# Patient Record
Sex: FEMALE | Race: WHITE | NOT HISPANIC OR LATINO | URBAN - METROPOLITAN AREA
[De-identification: names, ages, dates, MRNs, and addresses within clinical notes are randomized per-mention and may not be internally consistent; named-entity substitution may affect disease eponyms.]

---

## 2018-06-30 ENCOUNTER — EMERGENCY (EMERGENCY)
Facility: HOSPITAL | Age: 68
LOS: 1 days | Discharge: ANOTHER HOSPITAL | End: 2018-06-30
Attending: EMERGENCY MEDICINE
Payer: MEDICARE

## 2018-06-30 ENCOUNTER — TRANSCRIPTION ENCOUNTER (OUTPATIENT)
Age: 68
End: 2018-06-30

## 2018-06-30 VITALS
TEMPERATURE: 98 F | SYSTOLIC BLOOD PRESSURE: 175 MMHG | HEIGHT: 62 IN | RESPIRATION RATE: 16 BRPM | HEART RATE: 56 BPM | OXYGEN SATURATION: 100 % | WEIGHT: 179.9 LBS | DIASTOLIC BLOOD PRESSURE: 72 MMHG

## 2018-06-30 DIAGNOSIS — Z98.89 OTHER SPECIFIED POSTPROCEDURAL STATES: Chronic | ICD-10-CM

## 2018-06-30 DIAGNOSIS — S62.109A FRACTURE OF UNSPECIFIED CARPAL BONE, UNSPECIFIED WRIST, INITIAL ENCOUNTER FOR CLOSED FRACTURE: Chronic | ICD-10-CM

## 2018-06-30 PROCEDURE — 99285 EMERGENCY DEPT VISIT HI MDM: CPT

## 2018-06-30 NOTE — ED ADULT TRIAGE NOTE - CHIEF COMPLAINT QUOTE
tripped over the concrete curb and fell, hurt left leg, pain to left hip and knee, denies any trauma to head, no loc

## 2018-06-30 NOTE — ED ADULT NURSE NOTE - OBJECTIVE STATEMENT
Pt presents to the ED S/P fall on a curve in a parking lot at a bingo night, C/O left hip and left ankle pain. Denies hitting her head, or blood thinners. Noted left ankle abrasion. no deformity noted. Vss, afebrile. Awaiting X-ray will continue to monitor

## 2018-07-01 ENCOUNTER — INPATIENT (INPATIENT)
Facility: HOSPITAL | Age: 68
LOS: 3 days | Discharge: NURSING, REHAB OR LONG-TERM CARE | DRG: 482 | End: 2018-07-05
Attending: ORTHOPAEDIC SURGERY | Admitting: ORTHOPAEDIC SURGERY
Payer: MEDICARE

## 2018-07-01 VITALS
SYSTOLIC BLOOD PRESSURE: 131 MMHG | HEART RATE: 77 BPM | TEMPERATURE: 98 F | DIASTOLIC BLOOD PRESSURE: 83 MMHG | RESPIRATION RATE: 16 BRPM | OXYGEN SATURATION: 98 %

## 2018-07-01 VITALS
OXYGEN SATURATION: 97 % | DIASTOLIC BLOOD PRESSURE: 68 MMHG | HEART RATE: 64 BPM | RESPIRATION RATE: 16 BRPM | SYSTOLIC BLOOD PRESSURE: 137 MMHG

## 2018-07-01 DIAGNOSIS — Z98.89 OTHER SPECIFIED POSTPROCEDURAL STATES: Chronic | ICD-10-CM

## 2018-07-01 DIAGNOSIS — M25.552 PAIN IN LEFT HIP: ICD-10-CM

## 2018-07-01 DIAGNOSIS — I10 ESSENTIAL (PRIMARY) HYPERTENSION: ICD-10-CM

## 2018-07-01 DIAGNOSIS — S62.109A FRACTURE OF UNSPECIFIED CARPAL BONE, UNSPECIFIED WRIST, INITIAL ENCOUNTER FOR CLOSED FRACTURE: Chronic | ICD-10-CM

## 2018-07-01 DIAGNOSIS — M25.559 PAIN IN UNSPECIFIED HIP: ICD-10-CM

## 2018-07-01 LAB
ALBUMIN SERPL ELPH-MCNC: 3.7 G/DL — SIGNIFICANT CHANGE UP (ref 3.3–5)
ALBUMIN SERPL ELPH-MCNC: 4.1 G/DL — SIGNIFICANT CHANGE UP (ref 3.3–5)
ALP SERPL-CCNC: 73 U/L — SIGNIFICANT CHANGE UP (ref 40–120)
ALP SERPL-CCNC: 79 U/L — SIGNIFICANT CHANGE UP (ref 40–120)
ALT FLD-CCNC: 14 U/L — SIGNIFICANT CHANGE UP (ref 10–45)
ALT FLD-CCNC: 22 U/L — SIGNIFICANT CHANGE UP (ref 12–78)
ANION GAP SERPL CALC-SCNC: 14 MMOL/L — SIGNIFICANT CHANGE UP (ref 5–17)
ANION GAP SERPL CALC-SCNC: 7 MMOL/L — SIGNIFICANT CHANGE UP (ref 5–17)
APTT BLD: 25.1 SEC — LOW (ref 27.5–37.4)
APTT BLD: 25.4 SEC — LOW (ref 27.5–37.4)
AST SERPL-CCNC: 18 U/L — SIGNIFICANT CHANGE UP (ref 10–40)
AST SERPL-CCNC: 26 U/L — SIGNIFICANT CHANGE UP (ref 15–37)
BASOPHILS # BLD AUTO: 0 K/UL — SIGNIFICANT CHANGE UP (ref 0–0.2)
BASOPHILS # BLD AUTO: 0.04 K/UL — SIGNIFICANT CHANGE UP (ref 0–0.2)
BASOPHILS NFR BLD AUTO: 0.2 % — SIGNIFICANT CHANGE UP (ref 0–2)
BASOPHILS NFR BLD AUTO: 0.5 % — SIGNIFICANT CHANGE UP (ref 0–2)
BILIRUB SERPL-MCNC: 0.7 MG/DL — SIGNIFICANT CHANGE UP (ref 0.2–1.2)
BILIRUB SERPL-MCNC: 0.8 MG/DL — SIGNIFICANT CHANGE UP (ref 0.2–1.2)
BLD GP AB SCN SERPL QL: NEGATIVE — SIGNIFICANT CHANGE UP
BLD GP AB SCN SERPL QL: SIGNIFICANT CHANGE UP
BUN SERPL-MCNC: 17 MG/DL — SIGNIFICANT CHANGE UP (ref 7–23)
BUN SERPL-MCNC: 22 MG/DL — SIGNIFICANT CHANGE UP (ref 7–23)
CALCIUM SERPL-MCNC: 8.7 MG/DL — SIGNIFICANT CHANGE UP (ref 8.4–10.5)
CALCIUM SERPL-MCNC: 8.9 MG/DL — SIGNIFICANT CHANGE UP (ref 8.5–10.1)
CHLORIDE SERPL-SCNC: 101 MMOL/L — SIGNIFICANT CHANGE UP (ref 96–108)
CHLORIDE SERPL-SCNC: 103 MMOL/L — SIGNIFICANT CHANGE UP (ref 96–108)
CO2 SERPL-SCNC: 25 MMOL/L — SIGNIFICANT CHANGE UP (ref 22–31)
CO2 SERPL-SCNC: 29 MMOL/L — SIGNIFICANT CHANGE UP (ref 22–31)
CREAT SERPL-MCNC: 0.93 MG/DL — SIGNIFICANT CHANGE UP (ref 0.5–1.3)
CREAT SERPL-MCNC: 1 MG/DL — SIGNIFICANT CHANGE UP (ref 0.5–1.3)
EOSINOPHIL # BLD AUTO: 0 K/UL — SIGNIFICANT CHANGE UP (ref 0–0.5)
EOSINOPHIL # BLD AUTO: 0.05 K/UL — SIGNIFICANT CHANGE UP (ref 0–0.5)
EOSINOPHIL NFR BLD AUTO: 0.2 % — SIGNIFICANT CHANGE UP (ref 0–6)
EOSINOPHIL NFR BLD AUTO: 0.6 % — SIGNIFICANT CHANGE UP (ref 0–6)
GLUCOSE SERPL-MCNC: 117 MG/DL — HIGH (ref 70–99)
GLUCOSE SERPL-MCNC: 123 MG/DL — HIGH (ref 70–99)
HCT VFR BLD CALC: 40.7 % — SIGNIFICANT CHANGE UP (ref 34.5–45)
HCT VFR BLD CALC: 41.2 % — SIGNIFICANT CHANGE UP (ref 34.5–45)
HGB BLD-MCNC: 13.7 G/DL — SIGNIFICANT CHANGE UP (ref 11.5–15.5)
HGB BLD-MCNC: 14.2 G/DL — SIGNIFICANT CHANGE UP (ref 11.5–15.5)
IMM GRANULOCYTES NFR BLD AUTO: 0.4 % — SIGNIFICANT CHANGE UP (ref 0–1.5)
INR BLD: 0.96 RATIO — SIGNIFICANT CHANGE UP (ref 0.88–1.16)
INR BLD: 0.98 RATIO — SIGNIFICANT CHANGE UP (ref 0.88–1.16)
LYMPHOCYTES # BLD AUTO: 1.3 K/UL — SIGNIFICANT CHANGE UP (ref 1–3.3)
LYMPHOCYTES # BLD AUTO: 1.87 K/UL — SIGNIFICANT CHANGE UP (ref 1–3.3)
LYMPHOCYTES # BLD AUTO: 11.1 % — LOW (ref 13–44)
LYMPHOCYTES # BLD AUTO: 21.9 % — SIGNIFICANT CHANGE UP (ref 13–44)
MCHC RBC-ENTMCNC: 30.9 PG — SIGNIFICANT CHANGE UP (ref 27–34)
MCHC RBC-ENTMCNC: 31.3 PG — SIGNIFICANT CHANGE UP (ref 27–34)
MCHC RBC-ENTMCNC: 33.3 GM/DL — SIGNIFICANT CHANGE UP (ref 32–36)
MCHC RBC-ENTMCNC: 34.9 GM/DL — SIGNIFICANT CHANGE UP (ref 32–36)
MCV RBC AUTO: 89.8 FL — SIGNIFICANT CHANGE UP (ref 80–100)
MCV RBC AUTO: 92.6 FL — SIGNIFICANT CHANGE UP (ref 80–100)
MONOCYTES # BLD AUTO: 0.5 K/UL — SIGNIFICANT CHANGE UP (ref 0–0.9)
MONOCYTES # BLD AUTO: 0.57 K/UL — SIGNIFICANT CHANGE UP (ref 0–0.9)
MONOCYTES NFR BLD AUTO: 4.6 % — SIGNIFICANT CHANGE UP (ref 2–14)
MONOCYTES NFR BLD AUTO: 6.7 % — SIGNIFICANT CHANGE UP (ref 2–14)
NEUTROPHILS # BLD AUTO: 5.96 K/UL — SIGNIFICANT CHANGE UP (ref 1.8–7.4)
NEUTROPHILS # BLD AUTO: 9.7 K/UL — HIGH (ref 1.8–7.4)
NEUTROPHILS NFR BLD AUTO: 69.9 % — SIGNIFICANT CHANGE UP (ref 43–77)
NEUTROPHILS NFR BLD AUTO: 83.9 % — HIGH (ref 43–77)
NRBC # BLD: 0 /100 WBCS — SIGNIFICANT CHANGE UP (ref 0–0)
PLATELET # BLD AUTO: 164 K/UL — SIGNIFICANT CHANGE UP (ref 150–400)
PLATELET # BLD AUTO: 246 K/UL — SIGNIFICANT CHANGE UP (ref 150–400)
POTASSIUM SERPL-MCNC: 4 MMOL/L — SIGNIFICANT CHANGE UP (ref 3.5–5.3)
POTASSIUM SERPL-MCNC: 4.1 MMOL/L — SIGNIFICANT CHANGE UP (ref 3.5–5.3)
POTASSIUM SERPL-SCNC: 4 MMOL/L — SIGNIFICANT CHANGE UP (ref 3.5–5.3)
POTASSIUM SERPL-SCNC: 4.1 MMOL/L — SIGNIFICANT CHANGE UP (ref 3.5–5.3)
PROT SERPL-MCNC: 7 G/DL — SIGNIFICANT CHANGE UP (ref 6–8.3)
PROT SERPL-MCNC: 7.4 G/DL — SIGNIFICANT CHANGE UP (ref 6–8.3)
PROTHROM AB SERPL-ACNC: 10.5 SEC — SIGNIFICANT CHANGE UP (ref 9.8–12.7)
PROTHROM AB SERPL-ACNC: 10.7 SEC — SIGNIFICANT CHANGE UP (ref 9.8–12.7)
RBC # BLD: 4.44 M/UL — SIGNIFICANT CHANGE UP (ref 3.8–5.2)
RBC # BLD: 4.53 M/UL — SIGNIFICANT CHANGE UP (ref 3.8–5.2)
RBC # FLD: 11.7 % — SIGNIFICANT CHANGE UP (ref 10.3–14.5)
RBC # FLD: 12.6 % — SIGNIFICANT CHANGE UP (ref 10.3–14.5)
RH IG SCN BLD-IMP: POSITIVE — SIGNIFICANT CHANGE UP
SODIUM SERPL-SCNC: 139 MMOL/L — SIGNIFICANT CHANGE UP (ref 135–145)
SODIUM SERPL-SCNC: 140 MMOL/L — SIGNIFICANT CHANGE UP (ref 135–145)
WBC # BLD: 11.5 K/UL — HIGH (ref 3.8–10.5)
WBC # BLD: 8.52 K/UL — SIGNIFICANT CHANGE UP (ref 3.8–10.5)
WBC # FLD AUTO: 11.5 K/UL — HIGH (ref 3.8–10.5)
WBC # FLD AUTO: 8.52 K/UL — SIGNIFICANT CHANGE UP (ref 3.8–10.5)

## 2018-07-01 PROCEDURE — 80053 COMPREHEN METABOLIC PANEL: CPT

## 2018-07-01 PROCEDURE — 71045 X-RAY EXAM CHEST 1 VIEW: CPT | Mod: 26

## 2018-07-01 PROCEDURE — 96376 TX/PRO/DX INJ SAME DRUG ADON: CPT

## 2018-07-01 PROCEDURE — 99285 EMERGENCY DEPT VISIT HI MDM: CPT | Mod: 25

## 2018-07-01 PROCEDURE — 72190 X-RAY EXAM OF PELVIS: CPT

## 2018-07-01 PROCEDURE — 85610 PROTHROMBIN TIME: CPT

## 2018-07-01 PROCEDURE — 76377 3D RENDER W/INTRP POSTPROCES: CPT | Mod: 26

## 2018-07-01 PROCEDURE — 99284 EMERGENCY DEPT VISIT MOD MDM: CPT | Mod: 25

## 2018-07-01 PROCEDURE — 86900 BLOOD TYPING SEROLOGIC ABO: CPT

## 2018-07-01 PROCEDURE — 72192 CT PELVIS W/O DYE: CPT | Mod: 26

## 2018-07-01 PROCEDURE — 73562 X-RAY EXAM OF KNEE 3: CPT

## 2018-07-01 PROCEDURE — 85730 THROMBOPLASTIN TIME PARTIAL: CPT

## 2018-07-01 PROCEDURE — 85027 COMPLETE CBC AUTOMATED: CPT

## 2018-07-01 PROCEDURE — 86901 BLOOD TYPING SEROLOGIC RH(D): CPT

## 2018-07-01 PROCEDURE — 73502 X-RAY EXAM HIP UNI 2-3 VIEWS: CPT | Mod: 26,59,LT

## 2018-07-01 PROCEDURE — 96374 THER/PROPH/DIAG INJ IV PUSH: CPT

## 2018-07-01 PROCEDURE — 27227 TREAT HIP FRACTURE(S): CPT | Mod: LT

## 2018-07-01 PROCEDURE — 73552 X-RAY EXAM OF FEMUR 2/>: CPT

## 2018-07-01 PROCEDURE — 73552 X-RAY EXAM OF FEMUR 2/>: CPT | Mod: 26,LT

## 2018-07-01 PROCEDURE — 71045 X-RAY EXAM CHEST 1 VIEW: CPT | Mod: 26,77

## 2018-07-01 PROCEDURE — 73502 X-RAY EXAM HIP UNI 2-3 VIEWS: CPT

## 2018-07-01 PROCEDURE — 93010 ELECTROCARDIOGRAM REPORT: CPT

## 2018-07-01 PROCEDURE — 73562 X-RAY EXAM OF KNEE 3: CPT | Mod: 26,LT

## 2018-07-01 PROCEDURE — 96375 TX/PRO/DX INJ NEW DRUG ADDON: CPT

## 2018-07-01 PROCEDURE — 73503 X-RAY EXAM HIP UNI 4/> VIEWS: CPT | Mod: 26,LT

## 2018-07-01 PROCEDURE — 71045 X-RAY EXAM CHEST 1 VIEW: CPT

## 2018-07-01 PROCEDURE — 86850 RBC ANTIBODY SCREEN: CPT

## 2018-07-01 RX ORDER — OXYCODONE AND ACETAMINOPHEN 5; 325 MG/1; MG/1
1 TABLET ORAL EVERY 4 HOURS
Qty: 0 | Refills: 0 | Status: DISCONTINUED | OUTPATIENT
Start: 2018-07-01 | End: 2018-07-05

## 2018-07-01 RX ORDER — METOCLOPRAMIDE HCL 10 MG
5 TABLET ORAL EVERY 6 HOURS
Qty: 0 | Refills: 0 | Status: DISCONTINUED | OUTPATIENT
Start: 2018-07-01 | End: 2018-07-01

## 2018-07-01 RX ORDER — MORPHINE SULFATE 50 MG/1
4 CAPSULE, EXTENDED RELEASE ORAL ONCE
Qty: 0 | Refills: 0 | Status: DISCONTINUED | OUTPATIENT
Start: 2018-07-01 | End: 2018-07-01

## 2018-07-01 RX ORDER — MAGNESIUM HYDROXIDE 400 MG/1
30 TABLET, CHEWABLE ORAL DAILY
Qty: 0 | Refills: 0 | Status: DISCONTINUED | OUTPATIENT
Start: 2018-07-01 | End: 2018-07-01

## 2018-07-01 RX ORDER — HYDROMORPHONE HYDROCHLORIDE 2 MG/ML
1 INJECTION INTRAMUSCULAR; INTRAVENOUS; SUBCUTANEOUS EVERY 4 HOURS
Qty: 0 | Refills: 0 | Status: DISCONTINUED | OUTPATIENT
Start: 2018-07-01 | End: 2018-07-05

## 2018-07-01 RX ORDER — DOCUSATE SODIUM 100 MG
100 CAPSULE ORAL THREE TIMES A DAY
Qty: 0 | Refills: 0 | Status: DISCONTINUED | OUTPATIENT
Start: 2018-07-01 | End: 2018-07-01

## 2018-07-01 RX ORDER — DIPHENHYDRAMINE HCL 50 MG
12.5 CAPSULE ORAL EVERY 4 HOURS
Qty: 0 | Refills: 0 | Status: DISCONTINUED | OUTPATIENT
Start: 2018-07-01 | End: 2018-07-05

## 2018-07-01 RX ORDER — CHLORHEXIDINE GLUCONATE 213 G/1000ML
1 SOLUTION TOPICAL ONCE
Qty: 0 | Refills: 0 | Status: COMPLETED | OUTPATIENT
Start: 2018-07-01 | End: 2018-07-01

## 2018-07-01 RX ORDER — HYDROCHLOROTHIAZIDE 25 MG
12.5 TABLET ORAL DAILY
Qty: 0 | Refills: 0 | Status: DISCONTINUED | OUTPATIENT
Start: 2018-07-01 | End: 2018-07-05

## 2018-07-01 RX ORDER — SODIUM CHLORIDE 9 MG/ML
1000 INJECTION INTRAMUSCULAR; INTRAVENOUS; SUBCUTANEOUS
Qty: 0 | Refills: 0 | Status: DISCONTINUED | OUTPATIENT
Start: 2018-07-01 | End: 2018-07-04

## 2018-07-01 RX ORDER — LANOLIN ALCOHOL/MO/W.PET/CERES
3 CREAM (GRAM) TOPICAL AT BEDTIME
Qty: 0 | Refills: 0 | Status: DISCONTINUED | OUTPATIENT
Start: 2018-07-01 | End: 2018-07-01

## 2018-07-01 RX ORDER — ALBUTEROL 90 UG/1
1 AEROSOL, METERED ORAL EVERY 4 HOURS
Qty: 0 | Refills: 0 | Status: DISCONTINUED | OUTPATIENT
Start: 2018-07-01 | End: 2018-07-01

## 2018-07-01 RX ORDER — OXYCODONE HYDROCHLORIDE 5 MG/1
5 TABLET ORAL ONCE
Qty: 0 | Refills: 0 | Status: DISCONTINUED | OUTPATIENT
Start: 2018-07-01 | End: 2018-07-01

## 2018-07-01 RX ORDER — ONDANSETRON 8 MG/1
4 TABLET, FILM COATED ORAL EVERY 6 HOURS
Qty: 0 | Refills: 0 | Status: DISCONTINUED | OUTPATIENT
Start: 2018-07-01 | End: 2018-07-05

## 2018-07-01 RX ORDER — SODIUM CHLORIDE 9 MG/ML
1000 INJECTION INTRAMUSCULAR; INTRAVENOUS; SUBCUTANEOUS
Qty: 0 | Refills: 0 | Status: DISCONTINUED | OUTPATIENT
Start: 2018-07-01 | End: 2018-07-01

## 2018-07-01 RX ORDER — HYDROMORPHONE HYDROCHLORIDE 2 MG/ML
0.5 INJECTION INTRAMUSCULAR; INTRAVENOUS; SUBCUTANEOUS
Qty: 0 | Refills: 0 | Status: DISCONTINUED | OUTPATIENT
Start: 2018-07-01 | End: 2018-07-01

## 2018-07-01 RX ORDER — ACETAMINOPHEN 500 MG
1000 TABLET ORAL ONCE
Qty: 0 | Refills: 0 | Status: DISCONTINUED | OUTPATIENT
Start: 2018-07-01 | End: 2018-07-05

## 2018-07-01 RX ORDER — ACETAMINOPHEN 500 MG
975 TABLET ORAL EVERY 8 HOURS
Qty: 0 | Refills: 0 | Status: DISCONTINUED | OUTPATIENT
Start: 2018-07-01 | End: 2018-07-01

## 2018-07-01 RX ORDER — SODIUM CHLORIDE 9 MG/ML
1000 INJECTION, SOLUTION INTRAVENOUS
Qty: 0 | Refills: 0 | Status: DISCONTINUED | OUTPATIENT
Start: 2018-07-01 | End: 2018-07-02

## 2018-07-01 RX ORDER — DOCUSATE SODIUM 100 MG
100 CAPSULE ORAL THREE TIMES A DAY
Qty: 0 | Refills: 0 | Status: DISCONTINUED | OUTPATIENT
Start: 2018-07-01 | End: 2018-07-05

## 2018-07-01 RX ORDER — TRAMADOL HYDROCHLORIDE 50 MG/1
50 TABLET ORAL EVERY 6 HOURS
Qty: 0 | Refills: 0 | Status: DISCONTINUED | OUTPATIENT
Start: 2018-07-01 | End: 2018-07-01

## 2018-07-01 RX ORDER — HYDROCHLOROTHIAZIDE 25 MG
12.5 TABLET ORAL DAILY
Qty: 0 | Refills: 0 | Status: DISCONTINUED | OUTPATIENT
Start: 2018-07-01 | End: 2018-07-01

## 2018-07-01 RX ORDER — ENOXAPARIN SODIUM 100 MG/ML
40 INJECTION SUBCUTANEOUS DAILY
Qty: 0 | Refills: 0 | Status: DISCONTINUED | OUTPATIENT
Start: 2018-07-01 | End: 2018-07-05

## 2018-07-01 RX ORDER — ONDANSETRON 8 MG/1
4 TABLET, FILM COATED ORAL ONCE
Qty: 0 | Refills: 0 | Status: COMPLETED | OUTPATIENT
Start: 2018-07-01 | End: 2018-07-01

## 2018-07-01 RX ORDER — HYDROCHLOROTHIAZIDE 25 MG
6.25 TABLET ORAL DAILY
Qty: 0 | Refills: 0 | Status: DISCONTINUED | OUTPATIENT
Start: 2018-07-01 | End: 2018-07-01

## 2018-07-01 RX ORDER — ONDANSETRON 8 MG/1
4 TABLET, FILM COATED ORAL ONCE
Qty: 0 | Refills: 0 | Status: DISCONTINUED | OUTPATIENT
Start: 2018-07-01 | End: 2018-07-01

## 2018-07-01 RX ORDER — HYDROMORPHONE HYDROCHLORIDE 2 MG/ML
0.25 INJECTION INTRAMUSCULAR; INTRAVENOUS; SUBCUTANEOUS
Qty: 0 | Refills: 0 | Status: DISCONTINUED | OUTPATIENT
Start: 2018-07-01 | End: 2018-07-01

## 2018-07-01 RX ORDER — OXYCODONE HYDROCHLORIDE 5 MG/1
5 TABLET ORAL EVERY 4 HOURS
Qty: 0 | Refills: 0 | Status: DISCONTINUED | OUTPATIENT
Start: 2018-07-01 | End: 2018-07-01

## 2018-07-01 RX ORDER — ALBUTEROL 90 UG/1
1 AEROSOL, METERED ORAL EVERY 4 HOURS
Qty: 0 | Refills: 0 | Status: DISCONTINUED | OUTPATIENT
Start: 2018-07-01 | End: 2018-07-05

## 2018-07-01 RX ORDER — ACETAMINOPHEN 500 MG
650 TABLET ORAL EVERY 6 HOURS
Qty: 0 | Refills: 0 | Status: DISCONTINUED | OUTPATIENT
Start: 2018-07-01 | End: 2018-07-05

## 2018-07-01 RX ORDER — OXYCODONE AND ACETAMINOPHEN 5; 325 MG/1; MG/1
2 TABLET ORAL EVERY 4 HOURS
Qty: 0 | Refills: 0 | Status: DISCONTINUED | OUTPATIENT
Start: 2018-07-01 | End: 2018-07-05

## 2018-07-01 RX ORDER — SENNA PLUS 8.6 MG/1
2 TABLET ORAL AT BEDTIME
Qty: 0 | Refills: 0 | Status: DISCONTINUED | OUTPATIENT
Start: 2018-07-01 | End: 2018-07-01

## 2018-07-01 RX ORDER — OXYCODONE HYDROCHLORIDE 5 MG/1
2.5 TABLET ORAL EVERY 4 HOURS
Qty: 0 | Refills: 0 | Status: DISCONTINUED | OUTPATIENT
Start: 2018-07-01 | End: 2018-07-01

## 2018-07-01 RX ADMIN — SODIUM CHLORIDE 100 MILLILITER(S): 9 INJECTION, SOLUTION INTRAVENOUS at 22:10

## 2018-07-01 RX ADMIN — OXYCODONE AND ACETAMINOPHEN 2 TABLET(S): 5; 325 TABLET ORAL at 21:23

## 2018-07-01 RX ADMIN — MORPHINE SULFATE 4 MILLIGRAM(S): 50 CAPSULE, EXTENDED RELEASE ORAL at 02:14

## 2018-07-01 RX ADMIN — Medication 100 MILLIGRAM(S): at 22:07

## 2018-07-01 RX ADMIN — SODIUM CHLORIDE 125 MILLILITER(S): 9 INJECTION INTRAMUSCULAR; INTRAVENOUS; SUBCUTANEOUS at 09:42

## 2018-07-01 RX ADMIN — MORPHINE SULFATE 4 MILLIGRAM(S): 50 CAPSULE, EXTENDED RELEASE ORAL at 01:18

## 2018-07-01 RX ADMIN — ONDANSETRON 4 MILLIGRAM(S): 8 TABLET, FILM COATED ORAL at 01:18

## 2018-07-01 RX ADMIN — OXYCODONE AND ACETAMINOPHEN 2 TABLET(S): 5; 325 TABLET ORAL at 22:08

## 2018-07-01 RX ADMIN — CHLORHEXIDINE GLUCONATE 1 APPLICATION(S): 213 SOLUTION TOPICAL at 12:40

## 2018-07-01 RX ADMIN — SODIUM CHLORIDE 200 MILLILITER(S): 9 INJECTION INTRAMUSCULAR; INTRAVENOUS; SUBCUTANEOUS at 01:18

## 2018-07-01 RX ADMIN — MORPHINE SULFATE 4 MILLIGRAM(S): 50 CAPSULE, EXTENDED RELEASE ORAL at 04:19

## 2018-07-01 NOTE — PROGRESS NOTE ADULT - ASSESSMENT
68 yo female tripped over parking structure and landed on her left side causing an acetabular fracture requiring surgical repair.  The fall was purely accidental with no h/o syncope or seizure or vertigo.  Records were reviewed and   patient was examined.  THERE IS NO MEDICAL CONTRAINDICATION TO SURGERY AS PLANNED.

## 2018-07-01 NOTE — ED PROVIDER NOTE - MEDICAL DECISION MAKING DETAILS
66yo female p/w left acetabullar fracture from osh transferred for orthopedic evaluation and CT scan. No acute distress at this time, received morphine prior to arrival. No neurovascular compromise. Will obtain pre-op labs, ct pelvis and left femur, orthopedic consult. 68yo female p/w left acetabullar fracture from osh transferred for orthopedic evaluation and CT scan. No acute distress at this time, received morphine prior to arrival. No neurovascular compromise. Will obtain pre-op labs, ct pelvis and left femur, orthopedic consult.    Lisa JACOB:66 y/o female with hx of HTN here with L hip pain as a transfer from [place] for CT exam and ortho evaluation. Patient reports she fell in a parking lot on a cement divider and was not able to get up on her own or walk since. Patient had xrays at [place] that showed L acetabular fracture likely requiring surgical intervention. Denies head trauma, LOC, back pain, abd pain, focal weakness, melena, BRPPR, lacerations. Patient exam shows a woman in NAD with abd aoft and nontender and clear lungs. L hip with tenderness in lateral aspect and femoral and DP pulses palpable in LLE. L Knee appears w/o effusion. Unable to lift LLE. Likely Hip/Acetabular fracture. Plan preop labs and pain control and CT LLE and Ortho consult. Reassess

## 2018-07-01 NOTE — CONSULT NOTE ADULT - SUBJECTIVE AND OBJECTIVE BOX
67[facility] parking lock c/o severe L hip pain and inability to ambulate.  Patient denies headstrike or LOC. Localizes pain to Left hip/femur. Patient denies radiation of pain. Patient denies numbness/tingling/burning in the LLE. No other bone/joint complaints. Patient is a community ambulator at baseline without assistive devices. Patient has no issues w/ ADLs/IADLs.     Note: CT is broken and patient is unable to obtain proper imaging.     PAST MEDICAL & SURGICAL HISTORY:  Hypertension  Fx wrist s/p ORIF R Wrist    MEDICATIONS  (STANDING):  sodium chloride 0.9%. 1000 milliLiter(s) (200 mL/Hr) IV Continuous <Continuous>    MEDICATIONS  (PRN):    Allergies    No Known Drug Allergies  Nuts (Unknown)      T(C): 36.6 (06-30-18 @ 23:40), Max: 36.6 (06-30-18 @ 23:40)  HR: 56 (06-30-18 @ 23:40) (56 - 56)  BP: 175/72 (06-30-18 @ 23:40) (175/72 - 175/72)  RR: 16 (06-30-18 @ 23:40) (16 - 16)  SpO2: 100% (06-30-18 @ 23:40) (100% - 100%)    PE   LLE:  Skin intact; No ecchymosis/soft tissue swelling  Compartments soft; + TTP about hip. No TTP to knee/leg/ankle/foot, small abrasion over dorsum of left foot   ROM limited 2/2 pain   Unable to SLR; + Log Roll/Heel Strike  Motor intact GS/TA/FHL/EHL  SILT L2-S1  DP/PT pulses 2+    Secondary Survey:   No bony TTP to long bones or bony prominences; Full painless ROM throughout;  Able to SLR RLE, SILT, compartments soft, no calf TTP, Exam Unremarkable    Imaging:  XR demonstrating L acetabular fracture, possible Left hip fracture (Unofficial Read)    67y Female with Left Acetabular Fx, Rule out Left hip fracture   - patient needs urgent CT, will obtain Judet views to better classify fx to determine treatment, once judet views obtained will speak to Resident on call at [facility] to determine if transfer for CT and operative fixation is warranted  - FU Judet views  - Pain control  - NPO/IVF  - CBC/BMP/Coags/UA/T+S x2  - EKG/CXR  - will d/w attending 67y [facility] parking lock c/o severe L hip pain and inability to ambulate.  Patient denies headstrike or LOC. Localizes pain to Left hip/femur. Patient denies radiation of pain. Patient denies numbness/tingling/burning in the LLE. No other bone/joint complaints. Patient is a community ambulator at baseline without assistive devices. Patient has no issues w/ ADLs/IADLs.     Note: CT is broken and patient is unable to obtain proper imaging.     PAST MEDICAL & SURGICAL HISTORY:  Hypertension  Fx wrist s/p ORIF R Wrist    MEDICATIONS  (STANDING):  sodium chloride 0.9%. 1000 milliLiter(s) (200 mL/Hr) IV Continuous <Continuous>    MEDICATIONS  (PRN):    Allergies    No Known Drug Allergies  Nuts (Unknown)      T(C): 36.6 (06-30-18 @ 23:40), Max: 36.6 (06-30-18 @ 23:40)  HR: 56 (06-30-18 @ 23:40) (56 - 56)  BP: 175/72 (06-30-18 @ 23:40) (175/72 - 175/72)  RR: 16 (06-30-18 @ 23:40) (16 - 16)  SpO2: 100% (06-30-18 @ 23:40) (100% - 100%)    PE   LLE:  Skin intact; No ecchymosis/soft tissue swelling  Compartments soft; + TTP about hip. No TTP to knee/leg/ankle/foot, small abrasion over dorsum of left foot   ROM limited 2/2 pain   Unable to SLR; + Log Roll/Heel Strike  Motor intact GS/TA/FHL/EHL  SILT L2-S1  DP/PT pulses 2+    Secondary Survey:   No bony TTP to long bones or bony prominences; Full painless ROM throughout;  Able to SLR RLE, SILT, compartments soft, no calf TTP, Exam Unremarkable    Imaging:  XR demonstrating L acetabular fracture, possible Left hip fracture (Unofficial Read), no signs of dislocation  FU Judet Views    67y Female with Left Acetabular Fx, Rule out Left hip fracture   - patient needs urgent CT, Judets views demonstrate possible Femoral Head/Neck component with likely operative Acetabulum fx  - In best interest for patient to be transferred to [facility] for proper imaging and sx management   - D/w Ortho team at [facility], aware of patient and current imaging/plan  - D/w ED attending for ER to ER transfer  - Pain control  - NPO/IVF  - CBC/BMP/Coags/UA/T+S x2  - EKG/CXR  - will d/w attending

## 2018-07-01 NOTE — ED PROVIDER NOTE - OBJECTIVE STATEMENT
66yo female transfer from [facility] with acetabular fracture. Pt. was walking in parking lot in the dark tonight and fell over concrete divider. Pt. was unable to bear weight after. Denies head injury, loc, weakness, numbnes,s, tingling. reports pain to left hip. Pt. has history of HTN, not on anticoagulation. Pt. transferred for orthopedic evaluation and CT scan as CT scan machine at [facility] is not functioning at this time.

## 2018-07-01 NOTE — ED PROVIDER NOTE - MUSCULOSKELETAL, MLM
Spine appears kyphotic range of motion is not limited, there is pain and point tender to l hip pelvis is stable no pain on palp of knee, pt declines to allow passive rom of lle, she is able to fully move rle, there is an abrasion over left lateral mal. no shortening or external rotation of the lower extremity

## 2018-07-01 NOTE — H&P ADULT - ASSESSMENT
A/P: 67y Female with Left acetabulum fracture  Pain control  NWB LLE strict bedrest  FU labs/imaging  Ca/Vit D  Outpt osteoporosis workup  Admit to orthopedics, Dr. Miller to perform surgery this afternoon  Medical clearance/optimization for OR  D/w attending agrees with plan

## 2018-07-01 NOTE — ED ADULT NURSE REASSESSMENT NOTE - NS ED NURSE REASSESS COMMENT FT1
Patient resting in stretcher c/o 9/10 pain in the left hip.  Patient has yellow band on, red socks on and bed in lowest position.  Patient updated on plan of care.

## 2018-07-01 NOTE — ED PROVIDER NOTE - MUSCULOSKELETAL MINIMAL EXAM
pain with internal/external rotation of right hip, normal strenght, sensation and pulses distally on left lwoer extremity

## 2018-07-01 NOTE — PATIENT PROFILE ADULT. - NS PRO PT RIGHT SUPPORT PERSON
Number Of Freeze-Thaw Cycles: 1 freeze-thaw cycle Include Z78.9 (Other Specified Conditions Influencing Health Status) As An Associated Diagnosis?: No Medical Necessity Information: It is in your best interest to select a reason for this procedure from the list below. All of these items fulfill various CMS LCD requirements except the new and changing color options. Post-Care Instructions: I reviewed with the patient in detail post-care instructions. Patient is to wear sunprotection, and avoid picking at any of the treated lesions. Pt may apply Vaseline to crusted or scabbing areas. Consent: The patient's consent was obtained including but not limited to risks of crusting, scabbing, blistering, scarring, darker or lighter pigmentary change, recurrence, incomplete removal and infection. Detail Level: Detailed Medical Necessity Clause: This procedure was medically necessary because the lesions that were treated were: bleeding d/t repetitive trauma same name as above

## 2018-07-01 NOTE — ED PROVIDER NOTE - CHPI ED SYMPTOMS NEG
no fever/no tingling/no vomiting/no deformity/no numbness/no confusion/no loss of consciousness/no bleeding

## 2018-07-01 NOTE — ED PROVIDER NOTE - OBJECTIVE STATEMENT
pt is a 66 yo f who is sp orif r wrist by dr yates, her pmd is dr Cochran in [facility] any pmhx. was in a parkinglot and tripped over a cemetn curb inuring her left hip and leg.  unable to bear weight she was bib ems for eval  no cp no sob no head injury pain goes from l hip to knee pt is a 68 yo f who is sp orif r wrist by dr yates, her pmd is dr Cochran in [facility] any pmhx. was in a parking lot and tripped over a cement curb inuring her left hip and leg.  unable to bear weight she was bib ems for eval  preferred ortho is dr chambers.  no cp no sob no head injury pain goes from l hip to knee pt is a 66 yo f who is sp orif r wrist by dr yates, her pmd is dr Cochran in [place] denies any pmhx. was in a parking lot and tripped over a cement curb inuring her left hip and leg.  unable to bear weight she was bib ems for eval  preferred ortho is dr chambesr.  no cp no sob no head injury pain goes from l hip to knee

## 2018-07-01 NOTE — H&P ADULT - HISTORY OF PRESENT ILLNESS
67y Female community ambulatory presents c/o L hip pain and inability to ambulate sp mechanical fall tripped over curb in parkinglot. Patient was seen at [place] and transfered to [facility] this morning. Patient comfortable at this time. Denies HS/LOC. Denies numbness/tingling. Denies fever/chills. Denies pain/injury elsewhere.     HEALTH ISSUES - PROBLEM Dx:  HTN    MEDICATIONS  (STANDING):  acetaminophen   Tablet 975 milliGRAM(s) Oral every 8 hours  docusate sodium 100 milliGRAM(s) Oral three times a day  oxyCODONE    IR 5 milliGRAM(s) Oral once  senna 2 Tablet(s) Oral at bedtime  sodium chloride 0.9%. 1000 milliLiter(s) IV Continuous <Continuous>    Allergies  No Known Drug Allergies  Nuts (Nausea; Rash)  Intolerances                       13.7   11.5  )-----------( 164      ( 01 Jul 2018 05:34 )             41.2     01 Jul 2018 05:34  140    |  101    |  17     ----------------------------<  123    4.1     |  25     |  0.93     Ca    8.7        01 Jul 2018 05:34  TPro  7.0    /  Alb  4.1    /  TBili  0.8    /  DBili  x      /  AST  18     /  ALT  14     /  AlkPhos  73     01 Jul 2018 05:34  PT/INR - ( 01 Jul 2018 05:34 )   PT: 10.7 sec;   INR: 0.98 ratio    PTT - ( 01 Jul 2018 05:34 )  PTT:25.1 sec  Vital Signs Last 24 Hrs  T(C): 36.6 (07-01-18 @ 07:09), Max: 36.7 (07-01-18 @ 05:10)  T(F): 97.9 (07-01-18 @ 07:09), Max: 98.1 (07-01-18 @ 05:10)  HR: 60 (07-01-18 @ 07:09) (60 - 66)  BP: 138/56 (07-01-18 @ 07:09) (137/68 - 142/62)  BP(mean): --  RR: 16 (07-01-18 @ 07:09) (16 - 18)  SpO2: 100% (07-01-18 @ 07:09) (97% - 100%)    Imaging: XR demonstates L acetabulum fracture      Physical Exam  Gen: NAD  LLE: skin intact, unable to SLR LLE, + log roll LLE, +ttp hip/groin, no ttp elsewhere, +ehl/fhl/ta/gs function, no calf ttp, dp/pt pulse intact, compartments soft    Secondary survey: benign, nv intact, able to SLR contralateral leg, negative log roll contralateral leg, no bony ttp elsewhere

## 2018-07-01 NOTE — PROGRESS NOTE ADULT - SUBJECTIVE AND OBJECTIVE BOX
Patient is a 67y old  Female who presents with a chief complaint of Left acetabulum fracture (01 Jul 2018 07:59)      HPI:  67y Female community ambulatory presents c/o L hip pain and inability to ambulate sp mechanical fall tripped over curb in parkinglot. Patient was seen at [place] and transfered to [facility] this morning. Patient comfortable at this time. Denies HS/LOC. Denies numbness/tingling. Denies fever/chills. Denies pain/injury elsewhere.     Imaging: XR demonstates L acetabulum fracture    MEDICATIONS  (STANDING):  acetaminophen   Tablet 975 milliGRAM(s) Oral every 8 hours  docusate sodium 100 milliGRAM(s) Oral three times a day  hydrochlorothiazide 12.5 milliGRAM(s) Oral daily  oxyCODONE    IR 5 milliGRAM(s) Oral once  senna 2 Tablet(s) Oral at bedtime  sodium chloride 0.9%. 1000 milliLiter(s) (125 mL/Hr) IV Continuous <Continuous>    MEDICATIONS  (PRN):  ALBUTerol    90 MICROgram(s) HFA Inhaler 1 Puff(s) Inhalation every 4 hours PRN Shortness of Breath and/or Wheezing  HYDROmorphone  Injectable 0.5 milliGRAM(s) IV Push every 3 hours PRN Breakthrough Pain  magnesium hydroxide Suspension 30 milliLiter(s) Oral daily PRN Constipation  melatonin 3 milliGRAM(s) Oral at bedtime PRN Insomnia  metoclopramide Injectable 5 milliGRAM(s) IV Push every 6 hours PRN Nausea and/or Vomiting  oxyCODONE    IR 2.5 milliGRAM(s) Oral every 4 hours PRN Moderate Pain (4 - 6)  oxyCODONE    IR 5 milliGRAM(s) Oral every 4 hours PRN Severe Pain (7 - 10)  traMADol 50 milliGRAM(s) Oral every 6 hours PRN Mild Pain (1 - 3)    Home Medications:  albuterol CFC free 90 mcg/inh inhalation aerosol: 1 puff(s) inhaled every 4 hours (01 Jul 2018 05:54)  bisoprolol-hydrochlorothiazide 10 mg-6.25 mg oral tablet: 1 tab(s) orally once a day (01 Jul 2018 05:54)  saccharomyces boulardii lyo 250 mg oral capsule: 2 cap(s) orally 2 times a day (01 Jul 2018 05:54)    Allergies    No Known Drug Allergies  Nuts (Nausea; Rash)    Intolerances      PAST MEDICAL HISTORY:  Hypertension    PAST SURGICAL HISTORY:  Status post wrist surgery right      Diet:  (   ) Regular   ( x  ) Low Sodium   (   ) Low Cholesterol   (   ) Diabetic   (   ) Other    FAMILY HISTORY:  Heart disease         SOCIAL HISTORY:    Substance Use: (x  ) never used  (  ) other:  Tobacco Usage:  ( x  ) never smoked   (   ) former smoker   (   ) current smoker  (   ) pack years  (   ) last cigarette date  Alcohol Usage: social  Advanced Directives: (   ) None    (   ) DNR    (   ) DNI    (   ) Health Care Proxy:     REVIEW OF SYSTEM:  CONSTITUTIONAL: No fever, No change in weight, No fatigue  HEAD: No headache, No dizziness, No recent trauma  EYES: No eye pain, No visual disturbances, No discharge  ENT:  No difficulty hearing, No tinnitus, No vertigo, No sinus pain, No throat pain  NECK: No pain, No stiffness  BREASTS: No pain, No masses, No nipple discharge  RESPIRATORY: No cough, No wheezing, No chills, No hemoptysis, No shortness of breath at rest or exertional shortness of breath  CARDIOVASCULAR: No chest pain, No palpitations, No dizziness, No CHF, No arrhythmia, No cardiomegaly, No leg swelling  GASTROINTESTINAL: No abdominal, No epigastric pain. No nausea, No vomiting, No hematemesis, No diarrhea, No constipation. No melena, No hematochezia. No GERD  GENITOURINARY: No dysuria, No frequency, No hematuria, No incontinence, No nocturia, No hesitancy,  SKIN: No itching, No burning, No rashes, No lesions   LYMPH NODES: No history of enlarged glands  ENDOCRINE: No heat or cold intolerance, No hair loss. No osteoporosis, No thyroid disease  MUSCULOSKELETAL: No joint pain or swelling,   Left Hip pain  PSYCHIATRIC: No depression, No anxiety, No mood swings, No difficulty sleeping  HEME/LYMPH: No easy bruising, No anticoagulants, No bleeding disorder, No bleeding gums  ALLERGY AND IMMUNOLOGIC: No hives, No eczema  NEUROLOGICAL: No memory loss, No loss of strength, No numbness, No tremors    VITALS:  Vital Signs Last 24 Hrs  T(C): 36.6 (01 Jul 2018 08:58), Max: 36.7 (01 Jul 2018 05:10)  T(F): 97.9 (01 Jul 2018 08:58), Max: 98.1 (01 Jul 2018 05:10)  HR: 88 (01 Jul 2018 08:58) (60 - 88)  BP: 122/59 (01 Jul 2018 08:58) (122/59 - 142/62)  BP(mean): --  RR: 16 (01 Jul 2018 08:58) (16 - 18)  SpO2: 96% (01 Jul 2018 08:58) (96% - 100%)  I&O's Summary    01 Jul 2018 07:01  -  01 Jul 2018 10:47  --------------------------------------------------------  IN: 0 mL / OUT: 1000 mL / NET: -1000 mL        PHYSICAL EXAM:  GENERAL: NAD, well nourished and conversant  HEAD:  Atraumatic  EYES: EOM, PERRLA, conjunctiva pink and sclera white  ENT: No tonsillar erythema, exudates, or enlargement, moist mucous membranes, good dentition, no lesions  NECK: Supple, No JVD, normal thyroid, carotids with normal upstrokes and no bruits  CHEST/LUNG: Clear to auscultation bilaterally, No rales, rhonchi, wheezing, or rubs  HEART: Regular rate and rhythm, No murmurs, rubs, or gallops  ABDOMEN: Soft, nondistended, no masses, guarding, tenderness or rebound, bowel sounds present  EXTREMITIES:  2+ Peripheral Pulses, No clubbing, cyanosis, or edema.   (+) Left Acetabular fracture  LYMPH: No lymphadenopathy noted  SKIN: No rashes or lesions  NERVOUS SYSTEM:  Alert & Oriented X3, normal cognitive function. Motor Strength 5/5 right upper and right lower.  5/5 left upper and left lower extremities, DTRs 2+ intact and symmetric    LABS:        CBC Full  -  ( 01 Jul 2018 05:34 )  WBC Count : 11.5 K/uL  Hemoglobin : 13.7 g/dL  Hematocrit : 41.2 %  Platelet Count - Automated : 164 K/uL  Mean Cell Volume : 92.6 fl  Mean Cell Hemoglobin : 30.9 pg  Mean Cell Hemoglobin Concentration : 33.3 gm/dL  Auto Neutrophil # : 9.7 K/uL  Auto Lymphocyte # : 1.3 K/uL  Auto Monocyte # : 0.5 K/uL  Auto Eosinophil # : 0.0 K/uL  Auto Basophil # : 0.0 K/uL  Auto Neutrophil % : 83.9 %  Auto Lymphocyte % : 11.1 %  Auto Monocyte % : 4.6 %  Auto Eosinophil % : 0.2 %  Auto Basophil % : 0.2 %    07-01    140  |  101  |  17  ----------------------------<  123<H>  4.1   |  25  |  0.93    Ca    8.7      01 Jul 2018 05:34    TPro  7.0  /  Alb  4.1  /  TBili  0.8  /  DBili  x   /  AST  18  /  ALT  14  /  AlkPhos  73  07-01    LIVER FUNCTIONS - ( 01 Jul 2018 05:34 )  Alb: 4.1 g/dL / Pro: 7.0 g/dL / ALK PHOS: 73 U/L / ALT: 14 U/L / AST: 18 U/L / GGT: x           PT/INR - ( 01 Jul 2018 05:34 )   PT: 10.7 sec;   INR: 0.98 ratio         PTT - ( 01 Jul 2018 05:34 )  PTT:25.1 sec    CAPILLARY BLOOD GLUCOSE          RADIOLOGY & ADDITIONAL TESTS:    Consultant(s):    Care Discussed with Consultants/Other Providers [ ] YES  [ ] NO

## 2018-07-01 NOTE — ED PROVIDER NOTE - PROGRESS NOTE DETAILS
ortho resident evaluated pt ordered ct and xrays I am awaiting call back to determine placement re transfer or admission, pt aware of need for ct [facility] the [facility] she is comfortable at this point declined further pain meds after 3 view xray of pelvis, the ortho resident--there is an acetabular and femoral head fx  needs advanced surgical repair not done by ortho attending on call  he has contacted the ortho service at [facility] who will follow  [facility] transfer center called [facility]- ortho accepting is dr Miller ER attending is dr ELE Durand pt to go to [facility] er.

## 2018-07-01 NOTE — ED ADULT NURSE REASSESSMENT NOTE - NS ED NURSE REASSESS COMMENT FT1
Report received from LITA Mojica.  Patient awake and alert x 4 c/l left hip pain 8/10.  Patient has pulse present in left foot and sensation intact.  Patient has bed in lowest position and yellow fall band on. Report received from LITA Mojica.  Patient awake and alert x 4 c/l left hip pain 8/10.  Patient has pulse present in left foot and sensation intact.  Patient has bed in lowest position and yellow fall band on, red socks on.  Patient has fischer from [facility] draining clear yellow urine.

## 2018-07-01 NOTE — ED ADULT NURSE REASSESSMENT NOTE - NS ED NURSE REASSESS COMMENT FT1
transfer team at bedside, patient given Inj 4mg Morphine for pain, vitals documented, patient transferred onto stretcher.

## 2018-07-01 NOTE — ED ADULT NURSE REASSESSMENT NOTE - NS ED NURSE REASSESS COMMENT FT1
Catalan catheter placed by RN at this time. Patient tolerated well. 350 ml urine output upon insertion. ED MD aware. Safety maintained.

## 2018-07-01 NOTE — H&P ADULT - ATTENDING COMMENTS
L AC/PHT acetabular fracture with dome impaction. Given her age, and articular injury, plan for attempted percutaneous fixation vs open reduction was discussed with possible staged GAVIOTA in the future. All RBAs disussed. All questions answered. Informed consent obtained.

## 2018-07-01 NOTE — PROGRESS NOTE ADULT - PROBLEM SELECTOR PLAN 1
PATIENT WITH LEFT ACETABULAR FRACTURE REQUIRING SURGERY  THERE IS NO MEDICAL CONTRAINDICATION TO SURGERY AS PLANNED.

## 2018-07-01 NOTE — BRIEF OPERATIVE NOTE - POST-OP DX
Closed displaced fracture of anterior column of left acetabulum, initial encounter  07/01/2018    Active  Jun [place]

## 2018-07-01 NOTE — BRIEF OPERATIVE NOTE - PROCEDURE
<<-----Click on this checkbox to enter Procedure ORIF fracture of pelvis  07/01/2018    Active  LUPE

## 2018-07-01 NOTE — ED PROVIDER NOTE - ATTENDING CONTRIBUTION TO CARE
Attending MD Gonzalez:  I personally have seen and examined this patient.  Resident note reviewed and agree on plan of care and except where noted.  See MDM for details.

## 2018-07-01 NOTE — ED ADULT NURSE NOTE - OBJECTIVE STATEMENT
patient transferred from [facility] BIBA S/p tripped  & fall last night at 220 pm while walking with friends in a parking lot. Complained of left hip pain Dx fracture as viewed in Ct scan. Patient denies feeling dizzy, sob, chest pain. Received 2 doses of morphine prior to transport. Verbalized little pain at this time. patient transferred from [facility] BIBA S/p tripped  & fall last night at 2200 pm while walking with friends in a dark parking lot. Unable to put weight.Report  left hip pain Dx fracture as viewed in Ct scan from [place].  Patient denies hitting head, loc, feeling dizzy, sob, chest pain. Received 2 doses of morphine prior to transport. Verbalized little pain at this time. No hematoma or signs of bleeding over the afftected area. Not on blood thinner besides blood pressure meds. (+) strong peripheral pulses, mobile although limited. patient transferred from [facility] BIBA S/p tripped  & fall last night at 2200 pm while walking with friends in a dark parking lot. Unable to put weight.Report  left hip pain Dx fracture as viewed  from [place].  Patient denies hitting head, loc, feeling dizzy, sob, chest pain. Received 2 doses of morphine prior to transport. Verbalized little pain at this time. No hematoma or signs of bleeding over the afftected area. Not on blood thinner besides blood pressure meds. (+) strong peripheral pulses, mobile although limited.

## 2018-07-02 LAB
24R-OH-CALCIDIOL SERPL-MCNC: 19 NG/ML — LOW (ref 30–80)
ALBUMIN SERPL ELPH-MCNC: 3.4 G/DL — SIGNIFICANT CHANGE UP (ref 3.3–5)
ANION GAP SERPL CALC-SCNC: 12 MMOL/L — SIGNIFICANT CHANGE UP (ref 5–17)
BASOPHILS # BLD AUTO: 0.02 K/UL — SIGNIFICANT CHANGE UP (ref 0–0.2)
BASOPHILS NFR BLD AUTO: 0.3 % — SIGNIFICANT CHANGE UP (ref 0–2)
BUN SERPL-MCNC: 7 MG/DL — SIGNIFICANT CHANGE UP (ref 7–23)
CALCIUM SERPL-MCNC: 8.2 MG/DL — LOW (ref 8.4–10.5)
CHLORIDE SERPL-SCNC: 101 MMOL/L — SIGNIFICANT CHANGE UP (ref 96–108)
CO2 SERPL-SCNC: 28 MMOL/L — SIGNIFICANT CHANGE UP (ref 22–31)
CREAT SERPL-MCNC: 0.77 MG/DL — SIGNIFICANT CHANGE UP (ref 0.5–1.3)
EOSINOPHIL # BLD AUTO: 0.06 K/UL — SIGNIFICANT CHANGE UP (ref 0–0.5)
EOSINOPHIL NFR BLD AUTO: 0.8 % — SIGNIFICANT CHANGE UP (ref 0–6)
GLUCOSE SERPL-MCNC: 91 MG/DL — SIGNIFICANT CHANGE UP (ref 70–99)
HCT VFR BLD CALC: 37.6 % — SIGNIFICANT CHANGE UP (ref 34.5–45)
HGB BLD-MCNC: 12 G/DL — SIGNIFICANT CHANGE UP (ref 11.5–15.5)
IMM GRANULOCYTES NFR BLD AUTO: 0.1 % — SIGNIFICANT CHANGE UP (ref 0–1.5)
LYMPHOCYTES # BLD AUTO: 1.84 K/UL — SIGNIFICANT CHANGE UP (ref 1–3.3)
LYMPHOCYTES # BLD AUTO: 23.3 % — SIGNIFICANT CHANGE UP (ref 13–44)
MCHC RBC-ENTMCNC: 29.3 PG — SIGNIFICANT CHANGE UP (ref 27–34)
MCHC RBC-ENTMCNC: 31.9 GM/DL — LOW (ref 32–36)
MCV RBC AUTO: 91.9 FL — SIGNIFICANT CHANGE UP (ref 80–100)
MONOCYTES # BLD AUTO: 0.73 K/UL — SIGNIFICANT CHANGE UP (ref 0–0.9)
MONOCYTES NFR BLD AUTO: 9.2 % — SIGNIFICANT CHANGE UP (ref 2–14)
NEUTROPHILS # BLD AUTO: 5.24 K/UL — SIGNIFICANT CHANGE UP (ref 1.8–7.4)
NEUTROPHILS NFR BLD AUTO: 66.3 % — SIGNIFICANT CHANGE UP (ref 43–77)
PLATELET # BLD AUTO: 225 K/UL — SIGNIFICANT CHANGE UP (ref 150–400)
POTASSIUM SERPL-MCNC: 4 MMOL/L — SIGNIFICANT CHANGE UP (ref 3.5–5.3)
POTASSIUM SERPL-SCNC: 4 MMOL/L — SIGNIFICANT CHANGE UP (ref 3.5–5.3)
RBC # BLD: 4.09 M/UL — SIGNIFICANT CHANGE UP (ref 3.8–5.2)
RBC # FLD: 13.2 % — SIGNIFICANT CHANGE UP (ref 10.3–14.5)
SODIUM SERPL-SCNC: 141 MMOL/L — SIGNIFICANT CHANGE UP (ref 135–145)
WBC # BLD: 7.9 K/UL — SIGNIFICANT CHANGE UP (ref 3.8–10.5)
WBC # FLD AUTO: 7.9 K/UL — SIGNIFICANT CHANGE UP (ref 3.8–10.5)

## 2018-07-02 RX ORDER — CEFAZOLIN SODIUM 1 G
2000 VIAL (EA) INJECTION EVERY 8 HOURS
Qty: 0 | Refills: 0 | Status: COMPLETED | OUTPATIENT
Start: 2018-07-02 | End: 2018-07-02

## 2018-07-02 RX ADMIN — Medication 100 MILLIGRAM(S): at 13:54

## 2018-07-02 RX ADMIN — OXYCODONE AND ACETAMINOPHEN 1 TABLET(S): 5; 325 TABLET ORAL at 16:11

## 2018-07-02 RX ADMIN — OXYCODONE AND ACETAMINOPHEN 1 TABLET(S): 5; 325 TABLET ORAL at 17:41

## 2018-07-02 RX ADMIN — OXYCODONE AND ACETAMINOPHEN 2 TABLET(S): 5; 325 TABLET ORAL at 21:00

## 2018-07-02 RX ADMIN — ENOXAPARIN SODIUM 40 MILLIGRAM(S): 100 INJECTION SUBCUTANEOUS at 12:54

## 2018-07-02 RX ADMIN — Medication 100 MILLIGRAM(S): at 05:52

## 2018-07-02 RX ADMIN — OXYCODONE AND ACETAMINOPHEN 2 TABLET(S): 5; 325 TABLET ORAL at 08:00

## 2018-07-02 RX ADMIN — OXYCODONE AND ACETAMINOPHEN 2 TABLET(S): 5; 325 TABLET ORAL at 07:28

## 2018-07-02 RX ADMIN — OXYCODONE AND ACETAMINOPHEN 2 TABLET(S): 5; 325 TABLET ORAL at 20:29

## 2018-07-02 RX ADMIN — SODIUM CHLORIDE 100 MILLILITER(S): 9 INJECTION, SOLUTION INTRAVENOUS at 03:34

## 2018-07-02 RX ADMIN — Medication 100 MILLIGRAM(S): at 03:34

## 2018-07-02 RX ADMIN — Medication 12.5 MILLIGRAM(S): at 05:52

## 2018-07-02 NOTE — PROGRESS NOTE ADULT - SUBJECTIVE AND OBJECTIVE BOX
Patient is a 67y old  Female who presents with a chief complaint of Left acetabulum fracture (01 Jul 2018 07:59)      HPI:  67y Female community ambulatory presents c/o L hip pain and inability to ambulate sp mechanical fall tripped over curb in parkinglot. Patient was seen at [place] and transfered to [facility] this morning. Patient comfortable at this time. Denies HS/LOC. Denies numbness/tingling. Denies fever/chills. Denies pain/injury elsewhere.     Imaging: XR demonstates L acetabulum fracture    MEDICATIONS  (STANDING):  docusate sodium Liquid 100 milliGRAM(s) Oral three times a day  enoxaparin Injectable 40 milliGRAM(s) SubCutaneous daily  hydrochlorothiazide 12.5 milliGRAM(s) Oral daily    MEDICATIONS  (PRN):  acetaminophen   Tablet 650 milliGRAM(s) Oral every 6 hours PRN For Temp greater than 38 C (100.4 F)  acetaminophen  IVPB. 1000 milliGRAM(s) IV Intermittent once PRN Mild Pain (1 - 3)  ALBUTerol    90 MICROgram(s) HFA Inhaler 1 Puff(s) Inhalation every 4 hours PRN Shortness of Breath and/or Wheezing  diphenhydrAMINE   Injectable 12.5 milliGRAM(s) IV Push every 4 hours PRN Itching  HYDROmorphone  Injectable 1 milliGRAM(s) IV Push every 4 hours PRN Breakthrough Pain  ondansetron Injectable 4 milliGRAM(s) IV Push every 6 hours PRN Nausea  oxyCODONE    5 mG/acetaminophen 325 mG 1 Tablet(s) Oral every 4 hours PRN Mild to Moderate Pain  oxyCODONE    5 mG/acetaminophen 325 mG 2 Tablet(s) Oral every 4 hours PRN Severe Pain    Home Medications:  albuterol CFC free 90 mcg/inh inhalation aerosol: 1 puff(s) inhaled every 4 hours (01 Jul 2018 05:54)  bisoprolol-hydrochlorothiazide 10 mg-6.25 mg oral tablet: 1 tab(s) orally once a day (01 Jul 2018 05:54)  saccharomyces boulardii lyo 250 mg oral capsule: 2 cap(s) orally 2 times a day (01 Jul 2018 05:54)    Allergies    No Known Drug Allergies  Nuts (Nausea; Rash)    Vital Signs Last 24 Hrs  T(C): 36.8 (02 Jul 2018 16:45), Max: 37.4 (01 Jul 2018 22:30)  T(F): 98.2 (02 Jul 2018 16:45), Max: 99.3 (01 Jul 2018 22:30)  HR: 77 (02 Jul 2018 16:45) (65 - 79)  BP: 113/63 (02 Jul 2018 16:45) (100/65 - 128/72)  BP(mean): 89 (01 Jul 2018 19:30) (89 - 89)  RR: 16 (02 Jul 2018 16:45) (16 - 18)  SpO2: 95% (02 Jul 2018 16:45) (94% - 99%)    PHYSICAL EXAM:  GENERAL: NAD, well nourished and conversant  HEAD:  Atraumatic  EYES: EOM, PERRLA, conjunctiva pink and sclera white  ENT: No tonsillar erythema, exudates, or enlargement, moist mucous membranes, good dentition, no lesions  NECK: Supple, No JVD, normal thyroid, carotids with normal upstrokes and no bruits  CHEST/LUNG: Clear to auscultation bilaterally, No rales, rhonchi, wheezing, or rubs  HEART: Regular rate and rhythm, No murmurs, rubs, or gallops  ABDOMEN: Soft, nondistended, no masses, guarding, tenderness or rebound, bowel sounds present  EXTREMITIES:  2+ Peripheral Pulses, No clubbing, cyanosis, or edema.   (+) Left Acetabular fracture  LYMPH: No lymphadenopathy noted  SKIN: No rashes or lesions  NERVOUS SYSTEM:  Alert & Oriented X3, normal cognitive function. Motor Strength 5/5 right upper and right lower.  5/5 left upper and left lower extremities, DTRs 2+ intact and symmetric    LABS:          CBC Full  -  ( 02 Jul 2018 08:05 )  WBC Count : 7.90 K/uL  Hemoglobin : 12.0 g/dL  Hematocrit : 37.6 %  Platelet Count - Automated : 225 K/uL  Mean Cell Volume : 91.9 fl  Mean Cell Hemoglobin : 29.3 pg  Mean Cell Hemoglobin Concentration : 31.9 gm/dL  Auto Neutrophil # : 5.24 K/uL  Auto Lymphocyte # : 1.84 K/uL  Auto Monocyte # : 0.73 K/uL  Auto Eosinophil # : 0.06 K/uL  Auto Basophil # : 0.02 K/uL  Auto Neutrophil % : 66.3 %  Auto Lymphocyte % : 23.3 %  Auto Monocyte % : 9.2 %  Auto Eosinophil % : 0.8 %  Auto Basophil % : 0.3 %    07-02    141  |  101  |  7   ----------------------------<  91  4.0   |  28  |  0.77    Ca    8.2<L>      02 Jul 2018 06:41    TPro  x   /  Alb  3.4  /  TBili  x   /  DBili  x   /  AST  x   /  ALT  x   /  AlkPhos  x   07-02    LIVER FUNCTIONS - ( 02 Jul 2018 06:41 )  Alb: 3.4 g/dL / Pro: x     / ALK PHOS: x     / ALT: x     / AST: x     / GGT: x           PT/INR - ( 01 Jul 2018 05:34 )   PT: 10.7 sec;   INR: 0.98 ratio         PTT - ( 01 Jul 2018 05:34 )  PTT:25.1 sec Patient is a 67y old  Female who presents with a chief complaint of Left acetabulum fracture.  67y Female community ambulatory presents c/o L hip pain and inability to ambulate sp mechanical fall tripped over curb in parkinglot. Patient was seen at [facility] and transfered to [facility]. S/P ORIF of acetabular fracture on 07/01/18. Patient is more comfortable at this time. Denies HS/LOC. Denies numbness/tingling. Denies fever/chills. moderate pelvic pain post-op and beginning to ambulate without weight bearing.        MEDICATIONS  (STANDING):  docusate sodium Liquid 100 milliGRAM(s) Oral three times a day  enoxaparin Injectable 40 milliGRAM(s) SubCutaneous daily  hydrochlorothiazide 12.5 milliGRAM(s) Oral daily    MEDICATIONS  (PRN):  acetaminophen   Tablet 650 milliGRAM(s) Oral every 6 hours PRN For Temp greater than 38 C (100.4 F)  acetaminophen  IVPB. 1000 milliGRAM(s) IV Intermittent once PRN Mild Pain (1 - 3)  ALBUTerol    90 MICROgram(s) HFA Inhaler 1 Puff(s) Inhalation every 4 hours PRN Shortness of Breath and/or Wheezing  diphenhydrAMINE   Injectable 12.5 milliGRAM(s) IV Push every 4 hours PRN Itching  HYDROmorphone  Injectable 1 milliGRAM(s) IV Push every 4 hours PRN Breakthrough Pain  ondansetron Injectable 4 milliGRAM(s) IV Push every 6 hours PRN Nausea  oxyCODONE    5 mG/acetaminophen 325 mG 1 Tablet(s) Oral every 4 hours PRN Mild to Moderate Pain  oxyCODONE    5 mG/acetaminophen 325 mG 2 Tablet(s) Oral every 4 hours PRN Severe Pain    Home Medications:  albuterol CFC free 90 mcg/inh inhalation aerosol: 1 puff(s) inhaled every 4 hours (01 Jul 2018 05:54)  bisoprolol-hydrochlorothiazide 10 mg-6.25 mg oral tablet: 1 tab(s) orally once a day (01 Jul 2018 05:54)  saccharomyces boulardii lyo 250 mg oral capsule: 2 cap(s) orally 2 times a day (01 Jul 2018 05:54)    Allergies    No Known Drug Allergies  Nuts (Nausea; Rash)    Vital Signs Last 24 Hrs  T(C): 36.8 (02 Jul 2018 16:45), Max: 37.4 (01 Jul 2018 22:30)  T(F): 98.2 (02 Jul 2018 16:45), Max: 99.3 (01 Jul 2018 22:30)  HR: 77 (02 Jul 2018 16:45) (65 - 79)  BP: 113/63 (02 Jul 2018 16:45) (100/65 - 128/72)  BP(mean): 89 (01 Jul 2018 19:30) (89 - 89)  RR: 16 (02 Jul 2018 16:45) (16 - 18)  SpO2: 95% (02 Jul 2018 16:45) (94% - 99%)    PHYSICAL EXAM:  GENERAL: NAD, well nourished and conversant  HEAD:  Atraumatic  EYES: EOM, PERRLA, conjunctiva pink and sclera white  ENT: No tonsillar erythema, exudates, or enlargement, moist mucous membranes, good dentition, no lesions  NECK: Supple, No JVD, normal thyroid, carotids with normal upstrokes and no bruits  CHEST/LUNG: Clear to auscultation bilaterally, No rales, rhonchi, wheezing, or rubs  HEART: Regular rate and rhythm, No murmurs, rubs, or gallops  ABDOMEN: Soft, nondistended, no masses, guarding, tenderness or rebound, bowel sounds present  EXTREMITIES:  2+ Peripheral Pulses, No clubbing, cyanosis, or edema.   (+) Left Acetabular fracture  LYMPH: No lymphadenopathy noted  SKIN: No rashes or lesions  NERVOUS SYSTEM:  Alert & Oriented X3, normal cognitive function. Motor Strength 5/5 right upper and right lower.  5/5 left upper and left lower extremities, DTRs 2+ intact and symmetric    LABS:          CBC Full  -  ( 02 Jul 2018 08:05 )  WBC Count : 7.90 K/uL  Hemoglobin : 12.0 g/dL  Hematocrit : 37.6 %  Platelet Count - Automated : 225 K/uL  Mean Cell Volume : 91.9 fl  Mean Cell Hemoglobin : 29.3 pg  Mean Cell Hemoglobin Concentration : 31.9 gm/dL  Auto Neutrophil # : 5.24 K/uL  Auto Lymphocyte # : 1.84 K/uL  Auto Monocyte # : 0.73 K/uL  Auto Eosinophil # : 0.06 K/uL  Auto Basophil # : 0.02 K/uL  Auto Neutrophil % : 66.3 %  Auto Lymphocyte % : 23.3 %  Auto Monocyte % : 9.2 %  Auto Eosinophil % : 0.8 %  Auto Basophil % : 0.3 %    07-02    141  |  101  |  7   ----------------------------<  91  4.0   |  28  |  0.77    Ca    8.2<L>      02 Jul 2018 06:41    TPro  x   /  Alb  3.4  /  TBili  x   /  DBili  x   /  AST  x   /  ALT  x   /  AlkPhos  x   07-02    LIVER FUNCTIONS - ( 02 Jul 2018 06:41 )  Alb: 3.4 g/dL / Pro: x     / ALK PHOS: x     / ALT: x     / AST: x     / GGT: x           PT/INR - ( 01 Jul 2018 05:34 )   PT: 10.7 sec;   INR: 0.98 ratio         PTT - ( 01 Jul 2018 05:34 )  PTT:25.1 sec

## 2018-07-02 NOTE — PHYSICAL THERAPY INITIAL EVALUATION ADULT - ADDITIONAL COMMENTS
pt has 2-3 steps to enter depending on the entrance. pt lives with her niece.  pt is a community ambulator without AD at baseline

## 2018-07-02 NOTE — CHART NOTE - NSCHARTNOTEFT_GEN_A_CORE
Patient is a 67y old  Female who presents with a chief complaint of Left acetabulum fracture (01 Jul 2018 07:59), now s/p percutaneous anterior column fixation    Resting without complaints.  No Chest Pain, SOB, N/V.    T(C): 36.7 (07-01-18 @ 23:30), Max: 37.4 (07-01-18 @ 22:30)  HR: 65 (07-01-18 @ 23:30) (55 - 88)  BP: 112/57 (07-01-18 @ 23:30) (112/57 - 169/67)  RR: 16 (07-01-18 @ 23:30) (14 - 18)  SpO2: 94% (07-01-18 @ 23:30) (93% - 100%)  Wt(kg): --    Exam:  LLE:  dressing c/d/i  Motor intact TA/GCS/EHL/FHL   SILT DP/SP/Tib  cap refill <2 sec, wwp                              13.7   11.5  )-----------( 164      ( 01 Jul 2018 05:34 )             41.2    07-01    140  |  101  |  17  ----------------------------<  123<H>  4.1   |  25  |  0.93    Ca    8.7      01 Jul 2018 05:34    TPro  7.0  /  Alb  4.1  /  TBili  0.8  /  DBili  x   /  AST  18  /  ALT  14  /  AlkPhos  73  07-01    A/P: 67F s/p perc anterior column fixation  - FFWB  - pain control  - PT  - DVT ppx  - dispo planning

## 2018-07-02 NOTE — PROGRESS NOTE ADULT - PROBLEM SELECTOR PLAN 1
PATIENT WITH LEFT ACETABULAR FRACTURE REQUIRING SURGERY  THERE IS NO MEDICAL CONTRAINDICATION TO SURGERY AS PLANNED. Post-op and stable.

## 2018-07-02 NOTE — PROGRESS NOTE ADULT - SUBJECTIVE AND OBJECTIVE BOX
Patient seen and examined. Pain controlled. No acute events overnight. Denies cp/sob.     HEALTH ISSUES - PROBLEM Dx:  Essential hypertension: Essential hypertension  Pain of left hip joint: Pain of left hip joint        MEDICATIONS  (STANDING):  ceFAZolin   IVPB 2000 milliGRAM(s) IV Intermittent every 8 hours  docusate sodium Liquid 100 milliGRAM(s) Oral three times a day  enoxaparin Injectable 40 milliGRAM(s) SubCutaneous daily  hydrochlorothiazide 12.5 milliGRAM(s) Oral daily  lactated ringers. 1000 milliLiter(s) IV Continuous <Continuous>    Allergies    No Known Drug Allergies  Nuts (Nausea; Rash)  Nuts (Unknown)    Intolerances                            13.7   11.5  )-----------( 164      ( 01 Jul 2018 05:34 )             41.2     01 Jul 2018 05:34    140    |  101    |  17     ----------------------------<  123    4.1     |  25     |  0.93     Ca    8.7        01 Jul 2018 05:34    TPro  7.0    /  Alb  4.1    /  TBili  0.8    /  DBili  x      /  AST  18     /  ALT  14     /  AlkPhos  73     01 Jul 2018 05:34    PT/INR - ( 01 Jul 2018 05:34 )   PT: 10.7 sec;   INR: 0.98 ratio         PTT - ( 01 Jul 2018 05:34 )  PTT:25.1 sec  Vital Signs Last 24 Hrs  T(C): 36.7 (07-01-18 @ 23:30), Max: 37.4 (07-01-18 @ 22:30)  T(F): 98 (07-01-18 @ 23:30), Max: 99.3 (07-01-18 @ 22:30)  HR: 65 (07-01-18 @ 23:30) (55 - 88)  BP: 112/57 (07-01-18 @ 23:30) (112/57 - 169/67)  BP(mean): 89 (07-01-18 @ 19:30) (86 - 102)  RR: 16 (07-01-18 @ 23:30) (14 - 18)  SpO2: 94% (07-01-18 @ 23:30) (93% - 100%)    Physical Exam  Gen: NAD  LLE:   Dressing c/d/i  +ehl/fhl/ta/gs function  L2-S1 silt  Dp/pt pulse intact  No calf ttp  Compartments soft    A/P:  67y Female sp L acetabular perc screw fixation POD 1  Pain control  DVT ppx  PT/Flat foot WB LLE/OOB  FU labs  Medical management appreciated  Incentive spirometry  Dispo planning

## 2018-07-02 NOTE — PROGRESS NOTE ADULT - ASSESSMENT
66 yo female tripped over parking structure and landed on her left side causing an acetabular fracture requiring surgical repair.  The fall was purely accidental with no h/o syncope or seizure or vertigo.  Records were reviewed and   patient was examined.  THERE IS NO MEDICAL CONTRAINDICATION TO SURGERY AS PLANNED. 66 yo female tripped over parking structure and landed on her left side causing an acetabular fracture requiring surgical repair.  The fall was purely accidental with no h/o syncope or seizure or vertigo.  S/P ORIF of  left acetabular fracture 07/01/18. Now out of bed to chair and beginning to ambulate with no weight bearing and physical therapy.

## 2018-07-02 NOTE — PHYSICAL THERAPY INITIAL EVALUATION ADULT - PERTINENT HX OF CURRENT PROBLEM, REHAB EVAL
Pt is a 68yo F who fell in a parking lot found to have L displaced acetabular fx now POD s/p the above surgery.

## 2018-07-03 ENCOUNTER — TRANSCRIPTION ENCOUNTER (OUTPATIENT)
Age: 68
End: 2018-07-03

## 2018-07-03 RX ORDER — ENOXAPARIN SODIUM 100 MG/ML
40 INJECTION SUBCUTANEOUS
Qty: 0 | Refills: 0 | COMMUNITY
Start: 2018-07-03

## 2018-07-03 RX ORDER — CHOLECALCIFEROL (VITAMIN D3) 125 MCG
1000 CAPSULE ORAL
Qty: 0 | Refills: 0 | COMMUNITY
Start: 2018-07-03

## 2018-07-03 RX ORDER — ACETAMINOPHEN 500 MG
2 TABLET ORAL
Qty: 0 | Refills: 0 | COMMUNITY
Start: 2018-07-03

## 2018-07-03 RX ORDER — CHOLECALCIFEROL (VITAMIN D3) 125 MCG
1000 CAPSULE ORAL DAILY
Qty: 0 | Refills: 0 | Status: DISCONTINUED | OUTPATIENT
Start: 2018-07-03 | End: 2018-07-05

## 2018-07-03 RX ORDER — DOCUSATE SODIUM 100 MG
10 CAPSULE ORAL
Qty: 0 | Refills: 0 | COMMUNITY
Start: 2018-07-03

## 2018-07-03 RX ADMIN — Medication 100 MILLIGRAM(S): at 13:35

## 2018-07-03 RX ADMIN — OXYCODONE AND ACETAMINOPHEN 2 TABLET(S): 5; 325 TABLET ORAL at 17:32

## 2018-07-03 RX ADMIN — Medication 100 MILLIGRAM(S): at 21:52

## 2018-07-03 RX ADMIN — OXYCODONE AND ACETAMINOPHEN 2 TABLET(S): 5; 325 TABLET ORAL at 03:22

## 2018-07-03 RX ADMIN — OXYCODONE AND ACETAMINOPHEN 2 TABLET(S): 5; 325 TABLET ORAL at 18:30

## 2018-07-03 RX ADMIN — OXYCODONE AND ACETAMINOPHEN 2 TABLET(S): 5; 325 TABLET ORAL at 02:43

## 2018-07-03 RX ADMIN — OXYCODONE AND ACETAMINOPHEN 1 TABLET(S): 5; 325 TABLET ORAL at 10:30

## 2018-07-03 RX ADMIN — OXYCODONE AND ACETAMINOPHEN 1 TABLET(S): 5; 325 TABLET ORAL at 09:45

## 2018-07-03 RX ADMIN — OXYCODONE AND ACETAMINOPHEN 2 TABLET(S): 5; 325 TABLET ORAL at 21:49

## 2018-07-03 RX ADMIN — OXYCODONE AND ACETAMINOPHEN 2 TABLET(S): 5; 325 TABLET ORAL at 22:19

## 2018-07-03 RX ADMIN — Medication 1000 UNIT(S): at 13:34

## 2018-07-03 RX ADMIN — ENOXAPARIN SODIUM 40 MILLIGRAM(S): 100 INJECTION SUBCUTANEOUS at 13:34

## 2018-07-03 NOTE — PROGRESS NOTE ADULT - ASSESSMENT
66 yo female tripped over parking structure and landed on her left side causing an acetabular fracture requiring surgical repair.  The fall was purely accidental with no h/o syncope or seizure or vertigo.  S/P ORIF of  left acetabular fracture 07/01/18. Now out of bed to chair and beginning to ambulate with no weight bearing and physical therapy.

## 2018-07-03 NOTE — DISCHARGE NOTE ADULT - PATIENT PORTAL LINK FT
You can access the [employer] Patient Portal, offered by [facility], by registering with the following website: [email or web address]

## 2018-07-03 NOTE — DISCHARGE NOTE ADULT - HOSPITAL COURSE
History of Present Illness: 	  67y Female community ambulatory presents c/o L hip pain and inability to ambulate sp mechanical fall tripped over curb in parkinglot. Patient was seen at [place] and transfered to [facility] this morning. Patient comfortable at this time. Denies HS/LOC. Denies numbness/tingling. Denies fever/chills. Denies pain/injury elsewhere.     Admitted with the dx of left anterior column acetabulum fracture.  Medically cleared.  S/P percutaneous anterior column screw.  Tolerated procedure well.  Physical therapy ambulation FFWB.  PT recommended subacute rehab.  Will transfer when bed available

## 2018-07-03 NOTE — DISCHARGE NOTE ADULT - MEDICATION SUMMARY - MEDICATIONS TO TAKE
I will START or STAY ON the medications listed below when I get home from the hospital:    oxyCODONE-acetaminophen 5 mg-325 mg oral tablet  -- 1 tab(s) by mouth every 4 hours, As needed, Mild to Moderate Pain  -- Indication: For Pain mgt    acetaminophen 325 mg oral tablet  -- 2 tab(s) by mouth every 6 hours, As needed, For Temp greater than 38 C (100.4 F)  -- Indication: For temps    oxyCODONE-acetaminophen 5 mg-325 mg oral tablet  -- 2 tab(s) by mouth every 4 hours, As needed, Severe Pain  -- Indication: For Pain mgt    enoxaparin  -- 40 milligram(s) subcutaneous once a day x 6 weeks for DVT prophylaxis  -- Indication: For DVT prophylaxis    bisoprolol-hydrochlorothiazide 10 mg-6.25 mg oral tablet  -- 1 tab(s) by mouth once a day  -- Indication: For Essential hypertension    albuterol CFC free 90 mcg/inh inhalation aerosol  -- 1 puff(s) inhaled every 4 hours  -- Indication: For resp    docusate sodium 10 mg/mL oral liquid  -- 10 milliliter(s) by mouth 3 times a day  -- Indication: For laxative    cholecalciferol oral tablet  -- 1000 unit(s) by mouth once a day  -- Indication: For supplement I will START or STAY ON the medications listed below when I get home from the hospital:    oxyCODONE-acetaminophen 5 mg-325 mg oral tablet  -- 1 tab(s) by mouth every 4 hours, As needed, Mild to Moderate Pain  -- Indication: For Pain mgt    acetaminophen 325 mg oral tablet  -- 2 tab(s) by mouth every 6 hours, As needed, For Temp greater than 38 C (100.4 F)  -- Indication: For temps    oxyCODONE-acetaminophen 5 mg-325 mg oral tablet  -- 2 tab(s) by mouth every 4 hours, As needed, Severe Pain  -- Indication: For Pain mgt    enoxaparin  -- 40 milligram(s) subcutaneous once a day x 6 weeks for DVT prophylaxis  -- Indication: For DVT prophylaxis    bisoprolol-hydrochlorothiazide 10 mg-6.25 mg oral tablet  -- 1 tab(s) by mouth once a day  -- Indication: For Essential hypertension    albuterol CFC free 90 mcg/inh inhalation aerosol  -- 1 puff(s) inhaled every 4 hours  -- Indication: For Bronchodilator    docusate sodium 10 mg/mL oral liquid  -- 10 milliliter(s) by mouth 3 times a day  -- Indication: For laxative    cholecalciferol oral tablet  -- 1000 unit(s) by mouth once a day  -- Indication: For supplement

## 2018-07-03 NOTE — PROGRESS NOTE ADULT - SUBJECTIVE AND OBJECTIVE BOX
Patient seen and examined. Pain controlled, resting in bed.    ICU Vital Signs Last 24 Hrs  T(C): 37.3 (03 Jul 2018 04:33), Max: 37.3 (03 Jul 2018 04:33)  T(F): 99.1 (03 Jul 2018 04:33), Max: 99.1 (03 Jul 2018 04:33)  HR: 96 (03 Jul 2018 04:33) (77 - 98)  BP: 110/63 (03 Jul 2018 04:33) (100/65 - 128/58)  BP(mean): --  ABP: --  ABP(mean): --  RR: 18 (03 Jul 2018 04:33) (16 - 18)  SpO2: 95% (03 Jul 2018 04:33) (92% - 99%)    Gen: Awake, alert, NAD    LLE  -proximal dressing changed, other dressings clean and dry  -EHL/FHL/TA/GSC +  -SILT L4-S1  -DP/PT pulses 2+  -compartments soft  -no calf TTP

## 2018-07-03 NOTE — DISCHARGE NOTE ADULT - MEDICATION SUMMARY - MEDICATIONS TO STOP TAKING
I will STOP taking the medications listed below when I get home from the hospital:    saccharomyces boulardii lyo 250 mg oral capsule  -- 2 cap(s) by mouth 2 times a day

## 2018-07-03 NOTE — DISCHARGE NOTE ADULT - CARE PLAN
Principal Discharge DX:	Acetabular fracture  Goal:	reduce pain, improve ambulation  Assessment and plan of treatment:	F/U with Dr Miller after discharge from rehab.  Dressing changes as needed.  D/C staples/sutures POD#14 (7/15/18).  Physical therapy ambulation FFWB (flat foot weight bearing).  Lovenox x 6 weeks for DVT prophylaxis.

## 2018-07-03 NOTE — DISCHARGE NOTE ADULT - NS AS ACTIVITY OBS
No Heavy lifting/straining/sponge bathe until staples/sutures are removed./Walking-Indoors allowed/Stairs allowed/Do not drive or operate machinery/Walking-Outdoors allowed/Do not make important decisions

## 2018-07-03 NOTE — PROGRESS NOTE ADULT - ASSESSMENT
67F s/p Left per anterior column screw for acetabular fracture POD#2    1. Pain control  2. Flat-foot weightbearing  3. PT/OOB  4. DVT PPX  5. FU labs  6. Medical managemnent  7. DC planning  8. Discuss with attending

## 2018-07-03 NOTE — DISCHARGE NOTE ADULT - ADDITIONAL INSTRUCTIONS
F/U with Dr Miller after discharge from rehab.    Please f/u with your primary doctor after discharge from the hospital to discuss your hospital stay and any changes to your medication.

## 2018-07-04 DIAGNOSIS — S32.409A UNSPECIFIED FRACTURE OF UNSPECIFIED ACETABULUM, INITIAL ENCOUNTER FOR CLOSED FRACTURE: ICD-10-CM

## 2018-07-04 LAB
ANION GAP SERPL CALC-SCNC: 13 MMOL/L — SIGNIFICANT CHANGE UP (ref 5–17)
BUN SERPL-MCNC: 8 MG/DL — SIGNIFICANT CHANGE UP (ref 7–23)
CALCIUM SERPL-MCNC: 8.7 MG/DL — SIGNIFICANT CHANGE UP (ref 8.4–10.5)
CHLORIDE SERPL-SCNC: 99 MMOL/L — SIGNIFICANT CHANGE UP (ref 96–108)
CO2 SERPL-SCNC: 29 MMOL/L — SIGNIFICANT CHANGE UP (ref 22–31)
CREAT SERPL-MCNC: 0.74 MG/DL — SIGNIFICANT CHANGE UP (ref 0.5–1.3)
GLUCOSE SERPL-MCNC: 110 MG/DL — HIGH (ref 70–99)
HCT VFR BLD CALC: 35.3 % — SIGNIFICANT CHANGE UP (ref 34.5–45)
HGB BLD-MCNC: 11.5 G/DL — SIGNIFICANT CHANGE UP (ref 11.5–15.5)
MCHC RBC-ENTMCNC: 30.6 PG — SIGNIFICANT CHANGE UP (ref 27–34)
MCHC RBC-ENTMCNC: 32.6 GM/DL — SIGNIFICANT CHANGE UP (ref 32–36)
MCV RBC AUTO: 93.9 FL — SIGNIFICANT CHANGE UP (ref 80–100)
PLATELET # BLD AUTO: 226 K/UL — SIGNIFICANT CHANGE UP (ref 150–400)
POTASSIUM SERPL-MCNC: 3.3 MMOL/L — LOW (ref 3.5–5.3)
POTASSIUM SERPL-SCNC: 3.3 MMOL/L — LOW (ref 3.5–5.3)
RBC # BLD: 3.76 M/UL — LOW (ref 3.8–5.2)
RBC # FLD: 13.4 % — SIGNIFICANT CHANGE UP (ref 10.3–14.5)
SODIUM SERPL-SCNC: 141 MMOL/L — SIGNIFICANT CHANGE UP (ref 135–145)
WBC # BLD: 6.96 K/UL — SIGNIFICANT CHANGE UP (ref 3.8–10.5)
WBC # FLD AUTO: 6.96 K/UL — SIGNIFICANT CHANGE UP (ref 3.8–10.5)

## 2018-07-04 RX ORDER — POTASSIUM CHLORIDE 20 MEQ
20 PACKET (EA) ORAL ONCE
Qty: 0 | Refills: 0 | Status: COMPLETED | OUTPATIENT
Start: 2018-07-04 | End: 2018-07-04

## 2018-07-04 RX ADMIN — OXYCODONE AND ACETAMINOPHEN 2 TABLET(S): 5; 325 TABLET ORAL at 12:15

## 2018-07-04 RX ADMIN — OXYCODONE AND ACETAMINOPHEN 2 TABLET(S): 5; 325 TABLET ORAL at 21:29

## 2018-07-04 RX ADMIN — OXYCODONE AND ACETAMINOPHEN 2 TABLET(S): 5; 325 TABLET ORAL at 11:35

## 2018-07-04 RX ADMIN — Medication 1000 UNIT(S): at 11:34

## 2018-07-04 RX ADMIN — ENOXAPARIN SODIUM 40 MILLIGRAM(S): 100 INJECTION SUBCUTANEOUS at 11:34

## 2018-07-04 RX ADMIN — Medication 20 MILLIEQUIVALENT(S): at 18:45

## 2018-07-04 RX ADMIN — OXYCODONE AND ACETAMINOPHEN 2 TABLET(S): 5; 325 TABLET ORAL at 04:39

## 2018-07-04 RX ADMIN — Medication 12.5 MILLIGRAM(S): at 06:11

## 2018-07-04 RX ADMIN — OXYCODONE AND ACETAMINOPHEN 2 TABLET(S): 5; 325 TABLET ORAL at 05:09

## 2018-07-04 RX ADMIN — OXYCODONE AND ACETAMINOPHEN 2 TABLET(S): 5; 325 TABLET ORAL at 20:59

## 2018-07-04 NOTE — PROGRESS NOTE ADULT - SUBJECTIVE AND OBJECTIVE BOX
Patient is a 67y old  Female who presents with a chief complaint of Left acetabulum fracture.  67y Female community ambulatory presents c/o L hip pain and inability to ambulate sp mechanical fall tripped over curb in parking lot. Patient was seen at [facility] and transfered to [facility]. S/P ORIF of acetabular fracture on 07/01/18. Patient is more comfortable at this time. Denies HS/LOC. Denies numbness/tingling. Denies fever/chills.  Moderate  pelvic pain post-op and beginning to ambulate without weight bearing.  Patient continue to work with physical therapy.       MEDICATIONS  (STANDING):  cholecalciferol 1000 Unit(s) Oral daily  docusate sodium Liquid 100 milliGRAM(s) Oral three times a day  enoxaparin Injectable 40 milliGRAM(s) SubCutaneous daily  hydrochlorothiazide 12.5 milliGRAM(s) Oral daily  potassium chloride    Tablet ER 20 milliEquivalent(s) Oral once    MEDICATIONS  (PRN):  acetaminophen   Tablet 650 milliGRAM(s) Oral every 6 hours PRN For Temp greater than 38 C (100.4 F)  acetaminophen  IVPB. 1000 milliGRAM(s) IV Intermittent once PRN Mild Pain (1 - 3)  ALBUTerol    90 MICROgram(s) HFA Inhaler 1 Puff(s) Inhalation every 4 hours PRN Shortness of Breath and/or Wheezing  diphenhydrAMINE   Injectable 12.5 milliGRAM(s) IV Push every 4 hours PRN Itching  HYDROmorphone  Injectable 1 milliGRAM(s) IV Push every 4 hours PRN Breakthrough Pain  ondansetron Injectable 4 milliGRAM(s) IV Push every 6 hours PRN Nausea  oxyCODONE    5 mG/acetaminophen 325 mG 1 Tablet(s) Oral every 4 hours PRN Mild to Moderate Pain  oxyCODONE    5 mG/acetaminophen 325 mG 2 Tablet(s) Oral every 4 hours PRN Severe Pain          VITALS:   T(C): 36.7 (07-04-18 @ 16:45), Max: 36.9 (07-04-18 @ 05:07)  HR: 93 (07-04-18 @ 16:45) (77 - 100)  BP: 104/62 (07-04-18 @ 16:45) (104/62 - 119/62)  RR: 17 (07-04-18 @ 16:45) (17 - 18)  SpO2: 96% (07-04-18 @ 16:45) (94% - 96%)  Wt(kg): --    PHYSICAL EXAM:  GENERAL: NAD, well nourished and conversant  HEAD:  Atraumatic  EYES: EOM, PERRLA, conjunctiva pink and sclera white  ENT: No tonsillar erythema, exudates, or enlargement, moist mucous membranes, good dentition, no lesions  NECK: Supple, No JVD, normal thyroid, carotids with normal upstrokes and no bruits  CHEST/LUNG: Clear to auscultation bilaterally, No rales, rhonchi, wheezing, or rubs  HEART: Regular rate and rhythm, No murmurs, rubs, or gallops  ABDOMEN: Soft, nondistended, no masses, guarding, tenderness or rebound, bowel sounds present  EXTREMITIES:  2+ Peripheral Pulses, No clubbing, cyanosis, or edema.   (+) Left Acetabular fracture  LYMPH: No lymphadenopathy noted  SKIN: No rashes or lesions  NERVOUS SYSTEM:  Alert & Oriented X3, normal cognitive function. Motor Strength 5/5 right upper and right lower.  5/5 left upper and left lower extremities, DTRs 2+ intact and symmetric    LABS:        CBC Full  -  ( 04 Jul 2018 08:19 )  WBC Count : 6.96 K/uL  Hemoglobin : 11.5 g/dL  Hematocrit : 35.3 %  Platelet Count - Automated : 226 K/uL  Mean Cell Volume : 93.9 fl  Mean Cell Hemoglobin : 30.6 pg  Mean Cell Hemoglobin Concentration : 32.6 gm/dL  Auto Neutrophil # : x  Auto Lymphocyte # : x  Auto Monocyte # : x  Auto Eosinophil # : x  Auto Basophil # : x  Auto Neutrophil % : x  Auto Lymphocyte % : x  Auto Monocyte % : x  Auto Eosinophil % : x  Auto Basophil % : x    07-04    141  |  99  |  8   ----------------------------<  110<H>  3.3<L>   |  29  |  0.74    Ca    8.7      04 Jul 2018 06:33            CAPILLARY BLOOD GLUCOSE          RADIOLOGY & ADDITIONAL TESTS:

## 2018-07-04 NOTE — PROGRESS NOTE ADULT - SUBJECTIVE AND OBJECTIVE BOX
Pt examined approximately 730am.  Patient resting without complaints.  Denies chest pain, SOB, N/V.    T(C): 36.9 (07-04-18 @ 10:12)  T(F): 98.5 (07-04-18 @ 10:12)  HR: 77 (07-04-18 @ 10:12)  BP: 110/65 (07-04-18 @ 10:12)  RR: 18 (07-04-18 @ 10:12)  SpO2: 96% (07-04-18 @ 10:12)      Exam:  Alert and Oriented, No Acute Distress    L lower Extremity:  Hip Dsg CDI  Calf Soft, Non-tender  +PF/DF  Sensation grossly intact  +DP Pulse                          11.5   6.96  )-----------( 226      ( 04 Jul 2018 08:19 )             35.3        141  |  99  |  8   ----------------------------<  110<H>  3.3<L>   |  29  |  0.74      A/P: 67y Female s/p L percutaneous  anterior column fixation, POD3; Stable  -Pain Control  -DVT PPx; IS  -FFWB LLE  -Continue Current Tx.    JOAN ChristianC  Orthopedic Surgery  Pagers [phone]

## 2018-07-05 VITALS
RESPIRATION RATE: 18 BRPM | DIASTOLIC BLOOD PRESSURE: 63 MMHG | TEMPERATURE: 98 F | HEART RATE: 98 BPM | OXYGEN SATURATION: 97 % | SYSTOLIC BLOOD PRESSURE: 133 MMHG

## 2018-07-05 PROCEDURE — 99285 EMERGENCY DEPT VISIT HI MDM: CPT

## 2018-07-05 PROCEDURE — 80053 COMPREHEN METABOLIC PANEL: CPT

## 2018-07-05 PROCEDURE — 72192 CT PELVIS W/O DYE: CPT

## 2018-07-05 PROCEDURE — 82306 VITAMIN D 25 HYDROXY: CPT

## 2018-07-05 PROCEDURE — C1713: CPT

## 2018-07-05 PROCEDURE — 86900 BLOOD TYPING SEROLOGIC ABO: CPT

## 2018-07-05 PROCEDURE — 85027 COMPLETE CBC AUTOMATED: CPT

## 2018-07-05 PROCEDURE — 97162 PT EVAL MOD COMPLEX 30 MIN: CPT

## 2018-07-05 PROCEDURE — C1769: CPT

## 2018-07-05 PROCEDURE — 71045 X-RAY EXAM CHEST 1 VIEW: CPT

## 2018-07-05 PROCEDURE — 97110 THERAPEUTIC EXERCISES: CPT

## 2018-07-05 PROCEDURE — 76000 FLUOROSCOPY <1 HR PHYS/QHP: CPT

## 2018-07-05 PROCEDURE — 76377 3D RENDER W/INTRP POSTPROCES: CPT

## 2018-07-05 PROCEDURE — 93005 ELECTROCARDIOGRAM TRACING: CPT

## 2018-07-05 PROCEDURE — 97116 GAIT TRAINING THERAPY: CPT

## 2018-07-05 PROCEDURE — 85610 PROTHROMBIN TIME: CPT

## 2018-07-05 PROCEDURE — 85730 THROMBOPLASTIN TIME PARTIAL: CPT

## 2018-07-05 PROCEDURE — 86850 RBC ANTIBODY SCREEN: CPT

## 2018-07-05 PROCEDURE — 82040 ASSAY OF SERUM ALBUMIN: CPT

## 2018-07-05 PROCEDURE — 86901 BLOOD TYPING SEROLOGIC RH(D): CPT

## 2018-07-05 PROCEDURE — 80048 BASIC METABOLIC PNL TOTAL CA: CPT

## 2018-07-05 RX ADMIN — OXYCODONE AND ACETAMINOPHEN 2 TABLET(S): 5; 325 TABLET ORAL at 17:19

## 2018-07-05 RX ADMIN — OXYCODONE AND ACETAMINOPHEN 2 TABLET(S): 5; 325 TABLET ORAL at 16:13

## 2018-07-05 RX ADMIN — ENOXAPARIN SODIUM 40 MILLIGRAM(S): 100 INJECTION SUBCUTANEOUS at 11:56

## 2018-07-05 RX ADMIN — OXYCODONE AND ACETAMINOPHEN 2 TABLET(S): 5; 325 TABLET ORAL at 05:06

## 2018-07-05 RX ADMIN — Medication 1000 UNIT(S): at 11:57

## 2018-07-05 RX ADMIN — Medication 12.5 MILLIGRAM(S): at 05:06

## 2018-07-05 RX ADMIN — OXYCODONE AND ACETAMINOPHEN 2 TABLET(S): 5; 325 TABLET ORAL at 05:36

## 2018-07-05 NOTE — PROGRESS NOTE ADULT - ATTENDING COMMENTS
Beginning to ambulate and doing well. No medical complications post-op to date and to proceed with physical therapy, as tolerated. Continues pulmonary toilet to lessen atelectasis, leg exercises as taught to lessen the risk of DVT and supervised pain medications for post-op pain control. I anticipate transfer to rehabilitation to follow when cleared by orthopedics. Cleared by orthopedics for discharge to rehabilitation.  Full instructions provided regarding diagnosis, treatment, discharge medications, diet and follow up care on transfer sheet. Medically stable and for discharge to rehabilitation today as planned.

## 2018-07-05 NOTE — PROGRESS NOTE ADULT - PROBLEM SELECTOR PROBLEM 1
Pain of left hip joint
Acetabular fracture
Acetabular fracture
Pain of left hip joint
Pain of left hip joint

## 2018-07-05 NOTE — PROGRESS NOTE ADULT - PROVIDER SPECIALTY LIST ADULT
Internal Medicine
Orthopedics
Internal Medicine

## 2018-07-05 NOTE — PROGRESS NOTE ADULT - PROBLEM SELECTOR PROBLEM 2
Essential hypertension
Pain of left hip joint
Pain of left hip joint

## 2018-07-05 NOTE — PROGRESS NOTE ADULT - ASSESSMENT
66 y/o fm s/p left pelvic screw POD#4, physical therapy, rehab when bed available  Theresa Coronado PA-C  Orthopaedic Surgery  Team pager [phone]  [facility] [phone]  [phone]

## 2018-07-05 NOTE — PROGRESS NOTE ADULT - NSHPATTENDINGPLANDISCUSS_GEN_ALL_CORE
PATIENT AND ORTHO STAFF
the patient and her family at the bedside.
the patient and her family at the bedside.
the patient
the patient

## 2018-07-05 NOTE — PROGRESS NOTE ADULT - SUBJECTIVE AND OBJECTIVE BOX
Patient is a 67y old  Female who presents with a chief complaint of Left acetabulum fracture  Patient s/p left perc. pelvic screw POD#4  Patient resting without complaints.  No chest pain, SOB, N/V.    T(C): 36.6 (07-05-18 @ 04:20), Max: 36.9 (07-04-18 @ 10:12)  HR: 80 (07-05-18 @ 04:20) (77 - 96)  BP: 128/68 (07-05-18 @ 04:20) (104/62 - 133/71)  RR: 18 (07-05-18 @ 04:20) (17 - 18)  SpO2: 96% (07-05-18 @ 04:20) (94% - 98%)    Exam:  Alert and Oriented, No Acute Distress  Cards: +S1/S2, RRR  Pulm: CTAB  Lower Extremities:  Calves Soft, Non-tender bilaterally  +PF/DF/EHL/FHL  SILT  +DP Pulse                       11.5   6.96  )-----------( 226      ( 04 Jul 2018 08:19 )             35.3    07-04  141  |  99  |  8   ----------------------------<  110<H>  3.3<L>   |  29  |  0.74  Ca    8.7      04 Jul 2018 06:33

## 2018-07-05 NOTE — PROGRESS NOTE ADULT - SUBJECTIVE AND OBJECTIVE BOX
Patient is a 67y old  Female who presents with a chief complaint of Left acetabulum fracture.  67y Female community ambulatory presents c/o L hip pain and inability to ambulate sp mechanical fall tripped over curb in parking lot. Patient was seen at [facility] and transfered to [facility]. S/P ORIF of acetabular fracture on 07/01/18. Patient is more comfortable at this time. Denies HS/LOC. Denies numbness/tingling. Denies fever/chills.  Moderate  pelvic pain post-op and beginning to ambulate without weight bearing.  Patient continue to work with physical therapy.       MEDICATIONS  (STANDING):  cholecalciferol 1000 Unit(s) Oral daily  docusate sodium Liquid 100 milliGRAM(s) Oral three times a day  enoxaparin Injectable 40 milliGRAM(s) SubCutaneous daily  hydrochlorothiazide 12.5 milliGRAM(s) Oral daily    MEDICATIONS  (PRN):  acetaminophen   Tablet 650 milliGRAM(s) Oral every 6 hours PRN For Temp greater than 38 C (100.4 F)  acetaminophen  IVPB. 1000 milliGRAM(s) IV Intermittent once PRN Mild Pain (1 - 3)  ALBUTerol    90 MICROgram(s) HFA Inhaler 1 Puff(s) Inhalation every 4 hours PRN Shortness of Breath and/or Wheezing  diphenhydrAMINE   Injectable 12.5 milliGRAM(s) IV Push every 4 hours PRN Itching  HYDROmorphone  Injectable 1 milliGRAM(s) IV Push every 4 hours PRN Breakthrough Pain  ondansetron Injectable 4 milliGRAM(s) IV Push every 6 hours PRN Nausea  oxyCODONE    5 mG/acetaminophen 325 mG 1 Tablet(s) Oral every 4 hours PRN Mild to Moderate Pain  oxyCODONE    5 mG/acetaminophen 325 mG 2 Tablet(s) Oral every 4 hours PRN Severe Pain        Vital Signs Last 24 Hrs  T(C): 36.8 (05 Jul 2018 13:48), Max: 36.9 (04 Jul 2018 21:38)  T(F): 98.3 (05 Jul 2018 13:48), Max: 98.5 (05 Jul 2018 08:07)  HR: 90 (05 Jul 2018 13:48) (80 - 96)  BP: 128/65 (05 Jul 2018 13:48) (104/62 - 140/78)  BP(mean): --  RR: 18 (05 Jul 2018 13:48) (17 - 18)  SpO2: 96% (05 Jul 2018 13:48) (95% - 98%)  PHYSICAL EXAM:  GENERAL: NAD, well nourished and conversant  HEAD:  Atraumatic  EYES: EOM, PERRLA, conjunctiva pink and sclera white  ENT: No tonsillar erythema, exudates, or enlargement, moist mucous membranes, good dentition, no lesions  NECK: Supple, No JVD, normal thyroid, carotids with normal upstrokes and no bruits  CHEST/LUNG: Clear to auscultation bilaterally, No rales, rhonchi, wheezing, or rubs  HEART: Regular rate and rhythm, No murmurs, rubs, or gallops  ABDOMEN: Soft, nondistended, no masses, guarding, tenderness or rebound, bowel sounds present  EXTREMITIES:  2+ Peripheral Pulses, No clubbing, cyanosis, or edema.   (+) Left Acetabular fracture  LYMPH: No lymphadenopathy noted  SKIN: No rashes or lesions  NERVOUS SYSTEM:  Alert & Oriented X3, normal cognitive function. Motor Strength 5/5 right upper and right lower.  5/5 left upper and left lower extremities, DTRs 2+ intact and symmetric    LABS:  CBC Full  -  ( 04 Jul 2018 08:19 )  WBC Count : 6.96 K/uL  Hemoglobin : 11.5 g/dL  Hematocrit : 35.3 %  Platelet Count - Automated : 226 K/uL  Mean Cell Volume : 93.9 fl  Mean Cell Hemoglobin : 30.6 pg  Mean Cell Hemoglobin Concentration : 32.6 gm/dL  Auto Neutrophil # : x  Auto Lymphocyte # : x  Auto Monocyte # : x  Auto Eosinophil # : x  Auto Basophil # : x  Auto Neutrophil % : x  Auto Lymphocyte % : x  Auto Monocyte % : x  Auto Eosinophil % : x  Auto Basophil % : x    07-04    141  |  99  |  8   ----------------------------<  110<H>  3.3<L>   |  29  |  0.74    Ca    8.7      04 Jul 2018 06:33 Patient is a 67y old  Female who presents with a chief complaint of Left acetabulum fracture.  67y Female community ambulatory presents c/o L hip pain and inability to ambulate sp mechanical fall tripped over curb in parking lot. Patient was seen at [facility] and transfered to [facility]. S/P ORIF of acetabular fracture on 07/01/18. Patient is more comfortable at this time. Denies HS/LOC. Denies numbness/tingling. Denies fever/chills.  Moderate  pelvic pain post-op and beginning to ambulate without weight bearing.  Patient continues to work with physical therapy and will be transferred to rehabilitation later today.       MEDICATIONS  (STANDING):  cholecalciferol 1000 Unit(s) Oral daily  docusate sodium Liquid 100 milliGRAM(s) Oral three times a day  enoxaparin Injectable 40 milliGRAM(s) SubCutaneous daily  hydrochlorothiazide 12.5 milliGRAM(s) Oral daily    MEDICATIONS  (PRN):  acetaminophen   Tablet 650 milliGRAM(s) Oral every 6 hours PRN For Temp greater than 38 C (100.4 F)  acetaminophen  IVPB. 1000 milliGRAM(s) IV Intermittent once PRN Mild Pain (1 - 3)  ALBUTerol    90 MICROgram(s) HFA Inhaler 1 Puff(s) Inhalation every 4 hours PRN Shortness of Breath and/or Wheezing  diphenhydrAMINE   Injectable 12.5 milliGRAM(s) IV Push every 4 hours PRN Itching  HYDROmorphone  Injectable 1 milliGRAM(s) IV Push every 4 hours PRN Breakthrough Pain  ondansetron Injectable 4 milliGRAM(s) IV Push every 6 hours PRN Nausea  oxyCODONE    5 mG/acetaminophen 325 mG 1 Tablet(s) Oral every 4 hours PRN Mild to Moderate Pain  oxyCODONE    5 mG/acetaminophen 325 mG 2 Tablet(s) Oral every 4 hours PRN Severe Pain        Vital Signs Last 24 Hrs  T(C): 36.8 (05 Jul 2018 13:48), Max: 36.9 (04 Jul 2018 21:38)  T(F): 98.3 (05 Jul 2018 13:48), Max: 98.5 (05 Jul 2018 08:07)  HR: 90 (05 Jul 2018 13:48) (80 - 96)  BP: 128/65 (05 Jul 2018 13:48) (104/62 - 140/78)  BP(mean): --  RR: 18 (05 Jul 2018 13:48) (17 - 18)  SpO2: 96% (05 Jul 2018 13:48) (95% - 98%)  PHYSICAL EXAM:  GENERAL: NAD, well nourished and conversant  HEAD:  Atraumatic  EYES: EOM, PERRLA, conjunctiva pink and sclera white  ENT: No tonsillar erythema, exudates, or enlargement, moist mucous membranes, good dentition, no lesions  NECK: Supple, No JVD, normal thyroid, carotids with normal upstrokes and no bruits  CHEST/LUNG: Clear to auscultation bilaterally, No rales, rhonchi, wheezing, or rubs  HEART: Regular rate and rhythm, No murmurs, rubs, or gallops  ABDOMEN: Soft, nondistended, no masses, guarding, tenderness or rebound, bowel sounds present  EXTREMITIES:  2+ Peripheral Pulses, No clubbing, cyanosis, or edema.   (+) Left Acetabular fracture  LYMPH: No lymphadenopathy noted  SKIN: No rashes or lesions  NERVOUS SYSTEM:  Alert & Oriented X3, normal cognitive function. Motor Strength 5/5 right upper and right lower.  5/5 left upper and left lower extremities, DTRs 2+ intact and symmetric    LABS:  CBC Full  -  ( 04 Jul 2018 08:19 )  WBC Count : 6.96 K/uL  Hemoglobin : 11.5 g/dL  Hematocrit : 35.3 %  Platelet Count - Automated : 226 K/uL  Mean Cell Volume : 93.9 fl  Mean Cell Hemoglobin : 30.6 pg  Mean Cell Hemoglobin Concentration : 32.6 gm/dL  Auto Neutrophil # : x  Auto Lymphocyte # : x  Auto Monocyte # : x  Auto Eosinophil # : x  Auto Basophil # : x  Auto Neutrophil % : x  Auto Lymphocyte % : x  Auto Monocyte % : x  Auto Eosinophil % : x  Auto Basophil % : x    07-04    141  |  99  |  8   ----------------------------<  110<H>  3.3<L>   |  29  |  0.74    Ca    8.7      04 Jul 2018 06:33

## 2018-07-17 NOTE — ED PROVIDER NOTE - SKIN [+], MLM
Patient said she will run out before her appt., Aug 6. She will be out at end of this week. She would like it refilled, please. ABRASION

## 2018-07-23 PROBLEM — I10 ESSENTIAL (PRIMARY) HYPERTENSION: Chronic | Status: ACTIVE | Noted: 2018-06-30

## 2018-07-30 ENCOUNTER — APPOINTMENT (OUTPATIENT)
Dept: ORTHOPEDIC SURGERY | Facility: CLINIC | Age: 68
End: 2018-07-30
Payer: MEDICARE

## 2018-07-30 VITALS
SYSTOLIC BLOOD PRESSURE: 136 MMHG | DIASTOLIC BLOOD PRESSURE: 86 MMHG | HEART RATE: 62 BPM | BODY MASS INDEX: 30.73 KG/M2 | WEIGHT: 167 LBS | HEIGHT: 62 IN

## 2018-07-30 PROCEDURE — 72170 X-RAY EXAM OF PELVIS: CPT

## 2018-07-30 PROCEDURE — 99024 POSTOP FOLLOW-UP VISIT: CPT

## 2018-08-23 RX ORDER — OXYCODONE AND ACETAMINOPHEN 5; 325 MG/1; MG/1
5-325 TABLET ORAL
Qty: 90 | Refills: 0 | Status: ACTIVE | COMMUNITY
Start: 2018-08-23 | End: 1900-01-01

## 2018-09-10 ENCOUNTER — APPOINTMENT (OUTPATIENT)
Dept: ORTHOPEDIC SURGERY | Facility: CLINIC | Age: 68
End: 2018-09-10
Payer: MEDICARE

## 2018-09-10 VITALS — HEIGHT: 62 IN | BODY MASS INDEX: 30.73 KG/M2 | WEIGHT: 167 LBS

## 2018-09-10 PROCEDURE — 99024 POSTOP FOLLOW-UP VISIT: CPT

## 2018-09-10 PROCEDURE — 72190 X-RAY EXAM OF PELVIS: CPT

## 2018-10-22 ENCOUNTER — APPOINTMENT (OUTPATIENT)
Dept: ORTHOPEDIC SURGERY | Facility: CLINIC | Age: 68
End: 2018-10-22
Payer: MEDICARE

## 2018-10-22 PROCEDURE — 99214 OFFICE O/P EST MOD 30 MIN: CPT

## 2018-10-22 PROCEDURE — 72190 X-RAY EXAM OF PELVIS: CPT

## 2018-10-22 RX ORDER — TRAMADOL HYDROCHLORIDE 50 MG/1
50 TABLET, COATED ORAL
Qty: 90 | Refills: 0 | Status: ACTIVE | COMMUNITY
Start: 2018-09-10 | End: 1900-01-01

## 2018-10-23 ENCOUNTER — FORM ENCOUNTER (OUTPATIENT)
Age: 68
End: 2018-10-23

## 2018-10-24 ENCOUNTER — APPOINTMENT (OUTPATIENT)
Dept: CT IMAGING | Facility: CLINIC | Age: 68
End: 2018-10-24
Payer: MEDICARE

## 2018-10-24 ENCOUNTER — OUTPATIENT (OUTPATIENT)
Dept: OUTPATIENT SERVICES | Facility: HOSPITAL | Age: 68
LOS: 1 days | End: 2018-10-24
Payer: MEDICARE

## 2018-10-24 DIAGNOSIS — S62.109A FRACTURE OF UNSPECIFIED CARPAL BONE, UNSPECIFIED WRIST, INITIAL ENCOUNTER FOR CLOSED FRACTURE: Chronic | ICD-10-CM

## 2018-10-24 DIAGNOSIS — S32.492D: ICD-10-CM

## 2018-10-24 DIAGNOSIS — Z98.89 OTHER SPECIFIED POSTPROCEDURAL STATES: Chronic | ICD-10-CM

## 2018-10-24 PROCEDURE — 72192 CT PELVIS W/O DYE: CPT | Mod: 26

## 2018-10-24 PROCEDURE — 76377 3D RENDER W/INTRP POSTPROCES: CPT

## 2018-10-24 PROCEDURE — 72192 CT PELVIS W/O DYE: CPT

## 2018-10-24 PROCEDURE — 76377 3D RENDER W/INTRP POSTPROCES: CPT | Mod: 26

## 2018-11-12 ENCOUNTER — APPOINTMENT (OUTPATIENT)
Dept: ORTHOPEDIC SURGERY | Facility: CLINIC | Age: 68
End: 2018-11-12
Payer: MEDICARE

## 2018-11-12 PROCEDURE — 99214 OFFICE O/P EST MOD 30 MIN: CPT

## 2018-11-12 PROCEDURE — 72190 X-RAY EXAM OF PELVIS: CPT

## 2018-11-19 RX ORDER — TRAMADOL HYDROCHLORIDE 50 MG/1
50 TABLET, COATED ORAL
Qty: 50 | Refills: 0 | Status: ACTIVE | COMMUNITY
Start: 2018-11-19 | End: 1900-01-01

## 2018-11-26 ENCOUNTER — OUTPATIENT (OUTPATIENT)
Dept: OUTPATIENT SERVICES | Facility: HOSPITAL | Age: 68
LOS: 1 days | End: 2018-11-26
Payer: MEDICARE

## 2018-11-26 VITALS
WEIGHT: 143.96 LBS | DIASTOLIC BLOOD PRESSURE: 66 MMHG | RESPIRATION RATE: 16 BRPM | TEMPERATURE: 99 F | OXYGEN SATURATION: 100 % | HEART RATE: 60 BPM | SYSTOLIC BLOOD PRESSURE: 169 MMHG | HEIGHT: 62 IN

## 2018-11-26 DIAGNOSIS — S62.109A FRACTURE OF UNSPECIFIED CARPAL BONE, UNSPECIFIED WRIST, INITIAL ENCOUNTER FOR CLOSED FRACTURE: Chronic | ICD-10-CM

## 2018-11-26 DIAGNOSIS — Z01.818 ENCOUNTER FOR OTHER PREPROCEDURAL EXAMINATION: ICD-10-CM

## 2018-11-26 DIAGNOSIS — Z29.9 ENCOUNTER FOR PROPHYLACTIC MEASURES, UNSPECIFIED: ICD-10-CM

## 2018-11-26 DIAGNOSIS — M16.12 UNILATERAL PRIMARY OSTEOARTHRITIS, LEFT HIP: ICD-10-CM

## 2018-11-26 DIAGNOSIS — S32.492D: ICD-10-CM

## 2018-11-26 DIAGNOSIS — I10 ESSENTIAL (PRIMARY) HYPERTENSION: ICD-10-CM

## 2018-11-26 DIAGNOSIS — Z98.89 OTHER SPECIFIED POSTPROCEDURAL STATES: Chronic | ICD-10-CM

## 2018-11-26 DIAGNOSIS — Z96.7 PRESENCE OF OTHER BONE AND TENDON IMPLANTS: Chronic | ICD-10-CM

## 2018-11-26 LAB
ANION GAP SERPL CALC-SCNC: 12 MMOL/L — SIGNIFICANT CHANGE UP (ref 5–17)
BLD GP AB SCN SERPL QL: NEGATIVE — SIGNIFICANT CHANGE UP
BUN SERPL-MCNC: 14 MG/DL — SIGNIFICANT CHANGE UP (ref 7–23)
CALCIUM SERPL-MCNC: 10.3 MG/DL — SIGNIFICANT CHANGE UP (ref 8.4–10.5)
CHLORIDE SERPL-SCNC: 100 MMOL/L — SIGNIFICANT CHANGE UP (ref 96–108)
CO2 SERPL-SCNC: 28 MMOL/L — SIGNIFICANT CHANGE UP (ref 22–31)
CREAT SERPL-MCNC: 0.74 MG/DL — SIGNIFICANT CHANGE UP (ref 0.5–1.3)
GLUCOSE SERPL-MCNC: 112 MG/DL — HIGH (ref 70–99)
HBA1C BLD-MCNC: 5.5 % — SIGNIFICANT CHANGE UP (ref 4–5.6)
HCT VFR BLD CALC: 46 % — HIGH (ref 34.5–45)
HGB BLD-MCNC: 14.6 G/DL — SIGNIFICANT CHANGE UP (ref 11.5–15.5)
MCHC RBC-ENTMCNC: 29.6 PG — SIGNIFICANT CHANGE UP (ref 27–34)
MCHC RBC-ENTMCNC: 31.7 GM/DL — LOW (ref 32–36)
MCV RBC AUTO: 93.1 FL — SIGNIFICANT CHANGE UP (ref 80–100)
MRSA PCR RESULT.: SIGNIFICANT CHANGE UP
PLATELET # BLD AUTO: 271 K/UL — SIGNIFICANT CHANGE UP (ref 150–400)
POTASSIUM SERPL-MCNC: 3.9 MMOL/L — SIGNIFICANT CHANGE UP (ref 3.5–5.3)
POTASSIUM SERPL-SCNC: 3.9 MMOL/L — SIGNIFICANT CHANGE UP (ref 3.5–5.3)
RBC # BLD: 4.94 M/UL — SIGNIFICANT CHANGE UP (ref 3.8–5.2)
RBC # FLD: 13.3 % — SIGNIFICANT CHANGE UP (ref 10.3–14.5)
RH IG SCN BLD-IMP: POSITIVE — SIGNIFICANT CHANGE UP
S AUREUS DNA NOSE QL NAA+PROBE: SIGNIFICANT CHANGE UP
SODIUM SERPL-SCNC: 140 MMOL/L — SIGNIFICANT CHANGE UP (ref 135–145)
WBC # BLD: 6.8 K/UL — SIGNIFICANT CHANGE UP (ref 3.8–10.5)
WBC # FLD AUTO: 6.8 K/UL — SIGNIFICANT CHANGE UP (ref 3.8–10.5)

## 2018-11-26 PROCEDURE — 86850 RBC ANTIBODY SCREEN: CPT

## 2018-11-26 PROCEDURE — G0463: CPT

## 2018-11-26 PROCEDURE — 80048 BASIC METABOLIC PNL TOTAL CA: CPT

## 2018-11-26 PROCEDURE — 87640 STAPH A DNA AMP PROBE: CPT

## 2018-11-26 PROCEDURE — 87641 MR-STAPH DNA AMP PROBE: CPT

## 2018-11-26 PROCEDURE — 86900 BLOOD TYPING SEROLOGIC ABO: CPT

## 2018-11-26 PROCEDURE — 86901 BLOOD TYPING SEROLOGIC RH(D): CPT

## 2018-11-26 PROCEDURE — 85027 COMPLETE CBC AUTOMATED: CPT

## 2018-11-26 PROCEDURE — 83036 HEMOGLOBIN GLYCOSYLATED A1C: CPT

## 2018-11-26 RX ORDER — LIDOCAINE HCL 20 MG/ML
0.2 VIAL (ML) INJECTION ONCE
Qty: 0 | Refills: 0 | Status: DISCONTINUED | OUTPATIENT
Start: 2018-12-04 | End: 2018-12-04

## 2018-11-26 RX ORDER — ACETAMINOPHEN 500 MG
975 TABLET ORAL ONCE
Qty: 0 | Refills: 0 | Status: COMPLETED | OUTPATIENT
Start: 2018-12-04 | End: 2018-12-04

## 2018-11-26 RX ORDER — SODIUM CHLORIDE 9 MG/ML
3 INJECTION INTRAMUSCULAR; INTRAVENOUS; SUBCUTANEOUS EVERY 8 HOURS
Qty: 0 | Refills: 0 | Status: DISCONTINUED | OUTPATIENT
Start: 2018-12-04 | End: 2018-12-04

## 2018-11-26 RX ORDER — PANTOPRAZOLE SODIUM 20 MG/1
40 TABLET, DELAYED RELEASE ORAL ONCE
Qty: 0 | Refills: 0 | Status: COMPLETED | OUTPATIENT
Start: 2018-12-04 | End: 2018-12-04

## 2018-11-26 RX ORDER — TRAMADOL HYDROCHLORIDE 50 MG/1
50 TABLET ORAL ONCE
Qty: 0 | Refills: 0 | Status: DISCONTINUED | OUTPATIENT
Start: 2018-12-04 | End: 2018-12-04

## 2018-11-26 RX ORDER — CEFAZOLIN SODIUM 1 G
2000 VIAL (EA) INJECTION ONCE
Qty: 0 | Refills: 0 | Status: COMPLETED | OUTPATIENT
Start: 2018-12-04 | End: 2018-12-04

## 2018-11-26 RX ORDER — GABAPENTIN 400 MG/1
300 CAPSULE ORAL ONCE
Qty: 0 | Refills: 0 | Status: COMPLETED | OUTPATIENT
Start: 2018-12-04 | End: 2018-12-04

## 2018-11-26 NOTE — H&P PST ADULT - PROBLEM SELECTOR PLAN 1
left total hip arthroplasty  labs- CBC, BMP, Hb A1c, MRSA/MSSA nasal swab, T&S  pre op instructions discussed, Has PMD eval prior too OR

## 2018-11-26 NOTE — H&P PST ADULT - PMH
Fracture acetabulum-closed  06/30/2018  Hypertension    Mild intermittent asthma, unspecified whether complicated  denies ED admission or ED admission- denies any use of brochodilators x 3 years  OA (osteoarthritis) of hip  Left hip  Pneumonia  2015

## 2018-11-26 NOTE — H&P PST ADULT - PSH
Fx wrist    S/P ORIF (open reduction internal fixation) fracture  Left acetabulum 07/01/2108  Status post wrist surgery

## 2018-11-26 NOTE — H&P PST ADULT - ASSESSMENT
CAPRINI SCORE [CLOT]    AGE RELATED RISK FACTORS                                                       MOBILITY RELATED FACTORS  [ ] Age 41-60 years                                            (1 Point)                  [ ] Bed rest                                                        (1 Point)  [X] Age: 61-74 years                                           (2 Points)                 [ ] Plaster cast                                                   (2 Points)  [ ] Age= 75 years                                              (3 Points)                 [ ] Bed bound for more than 72 hours                 (2 Points)    DISEASE RELATED RISK FACTORS                                               GENDER SPECIFIC FACTORS  [ ] Edema in the lower extremities                       (1 Point)                  [ ] Pregnancy                                                     (1 Point)  [ ] Varicose veins                                               (1 Point)                  [ ] Post-partum < 6 weeks                                   (1 Point)             [X ] BMI > 25 Kg/m2                                            (1 Point)                  [ ] Hormonal therapy  or oral contraception          (1 Point)                 [ ] Sepsis (in the previous month)                        (1 Point)                  [ ] History of pregnancy complications                 (1 point)  [ ] Pneumonia or serious lung disease                                               [ ] Unexplained or recurrent                     (1 Point)           (in the previous month)                               (1 Point)  [ ] Abnormal pulmonary function test                     (1 Point)                 SURGERY RELATED RISK FACTORS  [ ] Acute myocardial infarction                              (1 Point)                 [ ]  Section                                             (1 Point)  [ ] Congestive heart failure (in the previous month)  (1 Point)               [ ] Minor surgery                                                  (1 Point)   [ ] Inflammatory bowel disease                             (1 Point)                 [ ] Arthroscopic surgery                                        (2 Points)  [ ] Central venous access                                      (2 Points)                [ ] General surgery lasting more than 45 minutes   (2 Points)       [ ] Stroke (in the previous month)                          (5 Points)               [X ] Elective arthroplasty                                         (5 Points)                                                                                                                                               HEMATOLOGY RELATED FACTORS                                                 TRAUMA RELATED RISK FACTORS  [ ] Prior episodes of VTE                                     (3 Points)                 [ ] Fracture of the hip, pelvis, or leg                       (5 Points)  [ ] Positive family history for VTE                         (3 Points)                 [ ] Acute spinal cord injury (in the previous month)  (5 Points)  [ ] Prothrombin 59646 A                                     (3 Points)                 [ ] Paralysis  (less than 1 month)                             (5 Points)  [ ] Factor V Leiden                                             (3 Points)                  [ ] Multiple Trauma within 1 month                        (5 Points)  [ ] Lupus anticoagulants                                     (3 Points)                                                           [ ] Anticardiolipin antibodies                               (3 Points)                                                       [ ] High homocysteine in the blood                      (3 Points)                                             [ ] Other congenital or acquired thrombophilia      (3 Points)                                                [ ] Heparin induced thrombocytopenia                  (3 Points)                                          Total Score [8     ]

## 2018-11-26 NOTE — H&P PST ADULT - ATTENDING COMMENTS
L Hip posttraumatic arthtritis after CRPP L Acetabulum 5mo ago, now healed, indicated for GAV[place]. All RBAs discussed. All questions answered. Informed consent obtained.

## 2018-11-26 NOTE — H&P PST ADULT - HISTORY OF PRESENT ILLNESS
67y Female with h/o HTN , left acetabulum fracture s/p ORIF left acetabulum on 07/01/2018. Pt had f/u orthopedic consult- s/p CT left hip revealed osteoarthritis left hip. Pt scheduled for left total hip arthroplasty on 12/04/2018.

## 2018-11-26 NOTE — H&P PST ADULT - MUSCULOSKELETAL
details… detailed exam decreased ROM/diminished strength/Left knee 1+ edema/joint swelling/decreased ROM due to pain

## 2018-11-26 NOTE — H&P PST ADULT - NSANTHOSAYNRD_GEN_A_CORE
No. BART screening performed.  STOP BANG Legend: 0-2 = LOW Risk; 3-4 = INTERMEDIATE Risk; 5-8 = HIGH Risk

## 2018-12-03 ENCOUNTER — TRANSCRIPTION ENCOUNTER (OUTPATIENT)
Age: 68
End: 2018-12-03

## 2018-12-04 ENCOUNTER — APPOINTMENT (OUTPATIENT)
Dept: ORTHOPEDIC SURGERY | Facility: HOSPITAL | Age: 68
End: 2018-12-04

## 2018-12-04 ENCOUNTER — INPATIENT (INPATIENT)
Facility: HOSPITAL | Age: 68
LOS: 2 days | Discharge: HOME | DRG: 470 | End: 2018-12-07
Attending: ORTHOPAEDIC SURGERY | Admitting: ORTHOPAEDIC SURGERY
Payer: MEDICARE

## 2018-12-04 VITALS
OXYGEN SATURATION: 100 % | WEIGHT: 143.96 LBS | RESPIRATION RATE: 18 BRPM | DIASTOLIC BLOOD PRESSURE: 67 MMHG | HEIGHT: 62 IN | SYSTOLIC BLOOD PRESSURE: 152 MMHG | HEART RATE: 53 BPM | TEMPERATURE: 98 F

## 2018-12-04 DIAGNOSIS — S32.492D: ICD-10-CM

## 2018-12-04 DIAGNOSIS — M16.12 UNILATERAL PRIMARY OSTEOARTHRITIS, LEFT HIP: ICD-10-CM

## 2018-12-04 DIAGNOSIS — Z96.7 PRESENCE OF OTHER BONE AND TENDON IMPLANTS: Chronic | ICD-10-CM

## 2018-12-04 DIAGNOSIS — Z98.89 OTHER SPECIFIED POSTPROCEDURAL STATES: Chronic | ICD-10-CM

## 2018-12-04 DIAGNOSIS — S62.109A FRACTURE OF UNSPECIFIED CARPAL BONE, UNSPECIFIED WRIST, INITIAL ENCOUNTER FOR CLOSED FRACTURE: Chronic | ICD-10-CM

## 2018-12-04 PROCEDURE — 72170 X-RAY EXAM OF PELVIS: CPT | Mod: 26

## 2018-12-04 PROCEDURE — 27130 TOTAL HIP ARTHROPLASTY: CPT | Mod: LT

## 2018-12-04 RX ORDER — SODIUM CHLORIDE 9 MG/ML
500 INJECTION INTRAMUSCULAR; INTRAVENOUS; SUBCUTANEOUS ONCE
Qty: 0 | Refills: 0 | Status: COMPLETED | OUTPATIENT
Start: 2018-12-04 | End: 2018-12-04

## 2018-12-04 RX ORDER — ACETAMINOPHEN 500 MG
1000 TABLET ORAL ONCE
Qty: 0 | Refills: 0 | Status: DISCONTINUED | OUTPATIENT
Start: 2018-12-04 | End: 2018-12-07

## 2018-12-04 RX ORDER — ONDANSETRON 8 MG/1
4 TABLET, FILM COATED ORAL ONCE
Qty: 0 | Refills: 0 | Status: DISCONTINUED | OUTPATIENT
Start: 2018-12-04 | End: 2018-12-04

## 2018-12-04 RX ORDER — HYDROMORPHONE HYDROCHLORIDE 2 MG/ML
0.4 INJECTION INTRAMUSCULAR; INTRAVENOUS; SUBCUTANEOUS
Qty: 0 | Refills: 0 | Status: DISCONTINUED | OUTPATIENT
Start: 2018-12-04 | End: 2018-12-04

## 2018-12-04 RX ORDER — DEXAMETHASONE 0.5 MG/5ML
8 ELIXIR ORAL ONCE
Qty: 0 | Refills: 0 | Status: COMPLETED | OUTPATIENT
Start: 2018-12-05 | End: 2018-12-05

## 2018-12-04 RX ORDER — DOCUSATE SODIUM 100 MG
100 CAPSULE ORAL THREE TIMES A DAY
Qty: 0 | Refills: 0 | Status: DISCONTINUED | OUTPATIENT
Start: 2018-12-04 | End: 2018-12-07

## 2018-12-04 RX ORDER — HYDROCHLOROTHIAZIDE 25 MG
6.25 TABLET ORAL DAILY
Qty: 0 | Refills: 0 | Status: DISCONTINUED | OUTPATIENT
Start: 2018-12-04 | End: 2018-12-04

## 2018-12-04 RX ORDER — ONDANSETRON 8 MG/1
4 TABLET, FILM COATED ORAL EVERY 6 HOURS
Qty: 0 | Refills: 0 | Status: DISCONTINUED | OUTPATIENT
Start: 2018-12-04 | End: 2018-12-07

## 2018-12-04 RX ORDER — CEFAZOLIN SODIUM 1 G
2000 VIAL (EA) INJECTION EVERY 8 HOURS
Qty: 0 | Refills: 0 | Status: COMPLETED | OUTPATIENT
Start: 2018-12-04 | End: 2018-12-05

## 2018-12-04 RX ORDER — ACETAMINOPHEN 500 MG
1000 TABLET ORAL ONCE
Qty: 0 | Refills: 0 | Status: COMPLETED | OUTPATIENT
Start: 2018-12-04 | End: 2018-12-05

## 2018-12-04 RX ORDER — POLYETHYLENE GLYCOL 3350 17 G/17G
17 POWDER, FOR SOLUTION ORAL DAILY
Qty: 0 | Refills: 0 | Status: DISCONTINUED | OUTPATIENT
Start: 2018-12-04 | End: 2018-12-07

## 2018-12-04 RX ORDER — ACETAMINOPHEN 500 MG
975 TABLET ORAL EVERY 8 HOURS
Qty: 0 | Refills: 0 | Status: DISCONTINUED | OUTPATIENT
Start: 2018-12-06 | End: 2018-12-07

## 2018-12-04 RX ORDER — OXYCODONE HYDROCHLORIDE 5 MG/1
10 TABLET ORAL EVERY 4 HOURS
Qty: 0 | Refills: 0 | Status: DISCONTINUED | OUTPATIENT
Start: 2018-12-04 | End: 2018-12-07

## 2018-12-04 RX ORDER — FERROUS SULFATE 325(65) MG
325 TABLET ORAL DAILY
Qty: 0 | Refills: 0 | Status: DISCONTINUED | OUTPATIENT
Start: 2018-12-04 | End: 2018-12-07

## 2018-12-04 RX ORDER — TRAMADOL HYDROCHLORIDE 50 MG/1
50 TABLET ORAL EVERY 8 HOURS
Qty: 0 | Refills: 0 | Status: DISCONTINUED | OUTPATIENT
Start: 2018-12-04 | End: 2018-12-07

## 2018-12-04 RX ORDER — ASPIRIN/CALCIUM CARB/MAGNESIUM 324 MG
325 TABLET ORAL
Qty: 0 | Refills: 0 | Status: DISCONTINUED | OUTPATIENT
Start: 2018-12-04 | End: 2018-12-07

## 2018-12-04 RX ORDER — CELECOXIB 200 MG/1
200 CAPSULE ORAL DAILY
Qty: 0 | Refills: 0 | Status: DISCONTINUED | OUTPATIENT
Start: 2018-12-06 | End: 2018-12-07

## 2018-12-04 RX ORDER — SENNA PLUS 8.6 MG/1
2 TABLET ORAL AT BEDTIME
Qty: 0 | Refills: 0 | Status: DISCONTINUED | OUTPATIENT
Start: 2018-12-04 | End: 2018-12-07

## 2018-12-04 RX ORDER — MAGNESIUM HYDROXIDE 400 MG/1
30 TABLET, CHEWABLE ORAL DAILY
Qty: 0 | Refills: 0 | Status: DISCONTINUED | OUTPATIENT
Start: 2018-12-04 | End: 2018-12-07

## 2018-12-04 RX ORDER — SODIUM CHLORIDE 9 MG/ML
500 INJECTION INTRAMUSCULAR; INTRAVENOUS; SUBCUTANEOUS ONCE
Qty: 0 | Refills: 0 | Status: COMPLETED | OUTPATIENT
Start: 2018-12-04

## 2018-12-04 RX ORDER — BISOPROLOL FUMARATE 10 MG/1
10 TABLET, FILM COATED ORAL DAILY
Qty: 0 | Refills: 0 | Status: DISCONTINUED | OUTPATIENT
Start: 2018-12-04 | End: 2018-12-07

## 2018-12-04 RX ORDER — SODIUM CHLORIDE 9 MG/ML
1000 INJECTION, SOLUTION INTRAVENOUS
Qty: 0 | Refills: 0 | Status: DISCONTINUED | OUTPATIENT
Start: 2018-12-04 | End: 2018-12-07

## 2018-12-04 RX ORDER — HYDROCHLOROTHIAZIDE 25 MG
12.5 TABLET ORAL DAILY
Qty: 0 | Refills: 0 | Status: DISCONTINUED | OUTPATIENT
Start: 2018-12-05 | End: 2018-12-07

## 2018-12-04 RX ORDER — OXYCODONE HYDROCHLORIDE 5 MG/1
5 TABLET ORAL EVERY 4 HOURS
Qty: 0 | Refills: 0 | Status: DISCONTINUED | OUTPATIENT
Start: 2018-12-04 | End: 2018-12-07

## 2018-12-04 RX ORDER — SODIUM CHLORIDE 9 MG/ML
500 INJECTION INTRAMUSCULAR; INTRAVENOUS; SUBCUTANEOUS ONCE
Qty: 0 | Refills: 0 | Status: COMPLETED | OUTPATIENT
Start: 2018-12-05 | End: 2018-12-05

## 2018-12-04 RX ORDER — KETOROLAC TROMETHAMINE 30 MG/ML
30 SYRINGE (ML) INJECTION EVERY 8 HOURS
Qty: 0 | Refills: 0 | Status: DISCONTINUED | OUTPATIENT
Start: 2018-12-04 | End: 2018-12-07

## 2018-12-04 RX ADMIN — SODIUM CHLORIDE 75 MILLILITER(S): 9 INJECTION, SOLUTION INTRAVENOUS at 15:29

## 2018-12-04 RX ADMIN — TRAMADOL HYDROCHLORIDE 50 MILLIGRAM(S): 50 TABLET ORAL at 22:54

## 2018-12-04 RX ADMIN — Medication 975 MILLIGRAM(S): at 07:56

## 2018-12-04 RX ADMIN — GABAPENTIN 300 MILLIGRAM(S): 400 CAPSULE ORAL at 07:08

## 2018-12-04 RX ADMIN — Medication 30 MILLIGRAM(S): at 14:26

## 2018-12-04 RX ADMIN — SODIUM CHLORIDE 1000 MILLILITER(S): 9 INJECTION INTRAMUSCULAR; INTRAVENOUS; SUBCUTANEOUS at 19:03

## 2018-12-04 RX ADMIN — HYDROMORPHONE HYDROCHLORIDE 0.4 MILLIGRAM(S): 2 INJECTION INTRAMUSCULAR; INTRAVENOUS; SUBCUTANEOUS at 12:25

## 2018-12-04 RX ADMIN — Medication 100 MILLIGRAM(S): at 16:51

## 2018-12-04 RX ADMIN — SODIUM CHLORIDE 3 MILLILITER(S): 9 INJECTION INTRAMUSCULAR; INTRAVENOUS; SUBCUTANEOUS at 07:11

## 2018-12-04 RX ADMIN — PANTOPRAZOLE SODIUM 40 MILLIGRAM(S): 20 TABLET, DELAYED RELEASE ORAL at 07:08

## 2018-12-04 RX ADMIN — Medication 325 MILLIGRAM(S): at 18:07

## 2018-12-04 RX ADMIN — Medication 100 MILLIGRAM(S): at 22:24

## 2018-12-04 RX ADMIN — Medication 30 MILLIGRAM(S): at 15:00

## 2018-12-04 RX ADMIN — SODIUM CHLORIDE 1000 MILLILITER(S): 9 INJECTION INTRAMUSCULAR; INTRAVENOUS; SUBCUTANEOUS at 13:39

## 2018-12-04 RX ADMIN — Medication 30 MILLIGRAM(S): at 22:24

## 2018-12-04 RX ADMIN — TRAMADOL HYDROCHLORIDE 50 MILLIGRAM(S): 50 TABLET ORAL at 15:00

## 2018-12-04 RX ADMIN — HYDROMORPHONE HYDROCHLORIDE 0.4 MILLIGRAM(S): 2 INJECTION INTRAMUSCULAR; INTRAVENOUS; SUBCUTANEOUS at 12:40

## 2018-12-04 RX ADMIN — TRAMADOL HYDROCHLORIDE 50 MILLIGRAM(S): 50 TABLET ORAL at 22:24

## 2018-12-04 RX ADMIN — HYDROMORPHONE HYDROCHLORIDE 0.4 MILLIGRAM(S): 2 INJECTION INTRAMUSCULAR; INTRAVENOUS; SUBCUTANEOUS at 13:00

## 2018-12-04 RX ADMIN — Medication 30 MILLIGRAM(S): at 22:54

## 2018-12-04 RX ADMIN — HYDROMORPHONE HYDROCHLORIDE 0.4 MILLIGRAM(S): 2 INJECTION INTRAMUSCULAR; INTRAVENOUS; SUBCUTANEOUS at 13:14

## 2018-12-04 RX ADMIN — TRAMADOL HYDROCHLORIDE 50 MILLIGRAM(S): 50 TABLET ORAL at 14:26

## 2018-12-04 RX ADMIN — HYDROMORPHONE HYDROCHLORIDE 0.4 MILLIGRAM(S): 2 INJECTION INTRAMUSCULAR; INTRAVENOUS; SUBCUTANEOUS at 12:10

## 2018-12-04 NOTE — PHYSICAL THERAPY INITIAL EVALUATION ADULT - ADDITIONAL COMMENTS
pt has 2-3 steps to enter depending on the entrance. pt lives with her niece.  since pt had ORIF of her pelvis in july, Pt uses a RW for gait. rehab'ed to the point where she was independent with a RW>

## 2018-12-04 NOTE — CHART NOTE - NSCHARTNOTEFT_GEN_A_CORE
Resting without complaints.  Sts NO residual effects from ORIF of L Pelvis in 7/18    No Chest Pain, SOB, N/V.    T(C): 36 (12-04-18 @ 11:35), Max: 36.5 (12-04-18 @ 06:36)  HR: 63 (12-04-18 @ 14:00) (50 - 63)  BP: 128/60 (12-04-18 @ 13:45) (113/56 - 152/67)  RR: 14 (12-04-18 @ 14:00) (14 - 18)  SpO2: 99% (12-04-18 @ 14:00) (97% - 100%)      Exam:  Alert and [place], No Acute Distress  Card: +S1/S2, RRR  Pulm: CTAB  Abdomen soft / benign  [place]  [ x]   EXT   LLE       Aquacel dressing C/D x[ ]        Calves soft       (+) DF  PF;  EHL / FHL  5/5        No Sensory Deficits noted        2+ pulses    Xray:---- Prosthesis in good alignment               Pelvis Screw in place        A/P: S/p Hip replacement, total, left    -PT/OT-WBAT- posterior precautions  -Chk AM Labs  -DVT PPx: Ecotrin BID  -Pain Control PO/IV Pain Rx  -Continue Current Tx  -Dispo planning: TBA      ***See Above  Kwadwo FRAGA  Orthopedics  B: [phone]  S: [phone]

## 2018-12-04 NOTE — PHYSICAL THERAPY INITIAL EVALUATION ADULT - IMPAIRMENTS FOUND, PT EVAL
ROM/gait, locomotion, and balance/joint integrity and mobility/muscle strength/aerobic capacity/endurance

## 2018-12-04 NOTE — PHYSICAL THERAPY INITIAL EVALUATION ADULT - CRITERIA FOR SKILLED THERAPEUTIC INTERVENTIONS
functional limitations in following categories/predicted duration of therapy intervention/anticipated equipment needs at discharge/impairments found/therapy frequency/rehab potential/anticipated discharge recommendation/risk reduction/prevention

## 2018-12-04 NOTE — BRIEF OPERATIVE NOTE - PROCEDURE
<<-----Click on this checkbox to enter Procedure Hip replacement, total, left  12/04/2018    Active  MIR

## 2018-12-04 NOTE — PHYSICAL THERAPY INITIAL EVALUATION ADULT - PLANNED THERAPY INTERVENTIONS, PT EVAL
gait training/strengthening/transfer training/balance training/bed mobility training/Pt will negotiate 1 flight of stairs indepenedently in 2 weeks.

## 2018-12-04 NOTE — PRE-ANESTHESIA EVALUATION ADULT - NSANTHPMHFT_GEN_ALL_CORE
pelvic fracture surgery.  No issues with anesthesia in the past  never hospitalized for asthma  exercise toll limited due to pain in LE.  walks with walker  denies CP/RUFFIN

## 2018-12-05 ENCOUNTER — TRANSCRIPTION ENCOUNTER (OUTPATIENT)
Age: 68
End: 2018-12-05

## 2018-12-05 LAB
ANION GAP SERPL CALC-SCNC: 10 MMOL/L — SIGNIFICANT CHANGE UP (ref 5–17)
BUN SERPL-MCNC: 12 MG/DL — SIGNIFICANT CHANGE UP (ref 7–23)
CALCIUM SERPL-MCNC: 9 MG/DL — SIGNIFICANT CHANGE UP (ref 8.4–10.5)
CHLORIDE SERPL-SCNC: 104 MMOL/L — SIGNIFICANT CHANGE UP (ref 96–108)
CO2 SERPL-SCNC: 27 MMOL/L — SIGNIFICANT CHANGE UP (ref 22–31)
CREAT SERPL-MCNC: 0.74 MG/DL — SIGNIFICANT CHANGE UP (ref 0.5–1.3)
GLUCOSE SERPL-MCNC: 114 MG/DL — HIGH (ref 70–99)
HCT VFR BLD CALC: 33.3 % — LOW (ref 34.5–45)
HGB BLD-MCNC: 10.7 G/DL — LOW (ref 11.5–15.5)
MCHC RBC-ENTMCNC: 29 PG — SIGNIFICANT CHANGE UP (ref 27–34)
MCHC RBC-ENTMCNC: 32.1 GM/DL — SIGNIFICANT CHANGE UP (ref 32–36)
MCV RBC AUTO: 90.2 FL — SIGNIFICANT CHANGE UP (ref 80–100)
PLATELET # BLD AUTO: 268 K/UL — SIGNIFICANT CHANGE UP (ref 150–400)
POTASSIUM SERPL-MCNC: 4.3 MMOL/L — SIGNIFICANT CHANGE UP (ref 3.5–5.3)
POTASSIUM SERPL-SCNC: 4.3 MMOL/L — SIGNIFICANT CHANGE UP (ref 3.5–5.3)
RBC # BLD: 3.69 M/UL — LOW (ref 3.8–5.2)
RBC # FLD: 13.4 % — SIGNIFICANT CHANGE UP (ref 10.3–14.5)
SODIUM SERPL-SCNC: 141 MMOL/L — SIGNIFICANT CHANGE UP (ref 135–145)
WBC # BLD: 10.8 K/UL — HIGH (ref 3.8–10.5)
WBC # FLD AUTO: 10.8 K/UL — HIGH (ref 3.8–10.5)

## 2018-12-05 RX ADMIN — TRAMADOL HYDROCHLORIDE 50 MILLIGRAM(S): 50 TABLET ORAL at 21:55

## 2018-12-05 RX ADMIN — Medication 100 MILLIGRAM(S): at 21:55

## 2018-12-05 RX ADMIN — Medication 101.6 MILLIGRAM(S): at 06:44

## 2018-12-05 RX ADMIN — Medication 325 MILLIGRAM(S): at 12:52

## 2018-12-05 RX ADMIN — TRAMADOL HYDROCHLORIDE 50 MILLIGRAM(S): 50 TABLET ORAL at 05:04

## 2018-12-05 RX ADMIN — TRAMADOL HYDROCHLORIDE 50 MILLIGRAM(S): 50 TABLET ORAL at 22:25

## 2018-12-05 RX ADMIN — Medication 325 MILLIGRAM(S): at 05:04

## 2018-12-05 RX ADMIN — Medication 30 MILLIGRAM(S): at 12:52

## 2018-12-05 RX ADMIN — POLYETHYLENE GLYCOL 3350 17 GRAM(S): 17 POWDER, FOR SOLUTION ORAL at 12:52

## 2018-12-05 RX ADMIN — Medication 30 MILLIGRAM(S): at 13:08

## 2018-12-05 RX ADMIN — Medication 400 MILLIGRAM(S): at 04:31

## 2018-12-05 RX ADMIN — Medication 1000 MILLIGRAM(S): at 05:01

## 2018-12-05 RX ADMIN — Medication 30 MILLIGRAM(S): at 22:26

## 2018-12-05 RX ADMIN — SODIUM CHLORIDE 1000 MILLILITER(S): 9 INJECTION INTRAMUSCULAR; INTRAVENOUS; SUBCUTANEOUS at 06:04

## 2018-12-05 RX ADMIN — Medication 325 MILLIGRAM(S): at 17:41

## 2018-12-05 RX ADMIN — Medication 100 MILLIGRAM(S): at 12:53

## 2018-12-05 RX ADMIN — Medication 1 TABLET(S): at 12:52

## 2018-12-05 RX ADMIN — Medication 30 MILLIGRAM(S): at 21:56

## 2018-12-05 RX ADMIN — Medication 30 MILLIGRAM(S): at 05:34

## 2018-12-05 RX ADMIN — Medication 100 MILLIGRAM(S): at 05:04

## 2018-12-05 RX ADMIN — TRAMADOL HYDROCHLORIDE 50 MILLIGRAM(S): 50 TABLET ORAL at 12:53

## 2018-12-05 RX ADMIN — TRAMADOL HYDROCHLORIDE 50 MILLIGRAM(S): 50 TABLET ORAL at 13:23

## 2018-12-05 RX ADMIN — Medication 30 MILLIGRAM(S): at 05:04

## 2018-12-05 RX ADMIN — TRAMADOL HYDROCHLORIDE 50 MILLIGRAM(S): 50 TABLET ORAL at 05:34

## 2018-12-05 RX ADMIN — OXYCODONE HYDROCHLORIDE 10 MILLIGRAM(S): 5 TABLET ORAL at 09:58

## 2018-12-05 RX ADMIN — Medication 100 MILLIGRAM(S): at 00:20

## 2018-12-05 RX ADMIN — OXYCODONE HYDROCHLORIDE 10 MILLIGRAM(S): 5 TABLET ORAL at 10:30

## 2018-12-05 NOTE — DISCHARGE NOTE ADULT - NS AS ACTIVITY OBS
Showering allowed/Walking-Indoors allowed/Stairs allowed/Walking-Outdoors allowed/Patient may shower, limit direct water to dressing, if wet pat dry./No Heavy lifting/straining/Do not make important decisions/Do not drive or operate machinery

## 2018-12-05 NOTE — PROGRESS NOTE ADULT - SUBJECTIVE AND OBJECTIVE BOX
67F s/p left Total hip after failed acetabulum fx POD#1, Doing well, no complaints, was able to ambulate with some assistance     Vital Signs Last 24 Hrs  T(C): 36.4 (05 Dec 2018 04:39), Max: 36.8 (04 Dec 2018 18:35)  T(F): 97.6 (05 Dec 2018 04:39), Max: 98.3 (04 Dec 2018 18:35)  HR: 63 (05 Dec 2018 04:39) (50 - 66)  BP: 118/63 (05 Dec 2018 04:39) (99/60 - 152/67)  BP(mean): 81 (04 Dec 2018 18:00) (74 - 94)  RR: 18 (05 Dec 2018 04:39) (14 - 18)  SpO2: 96% (05 Dec 2018 04:39) (94% - 100%)    AAOx3   LLE   Compartments soft  E/F/T/G M+   L2-S1 SILT   DSG CDI     a/p   67 F s/p L Total hip day 1   Pain control   WBAT   PT   DVT PPX   DC Planning

## 2018-12-05 NOTE — DISCHARGE NOTE ADULT - ADDITIONAL INSTRUCTIONS
Please follow up with Dr. Miller 7-10 days after discharge from hospital (call to schedule appointment). Please keep dressings clean/dry/intact.    Recommended to follow up with your primary care physician to discuss recent surgery and any potential changes to medications. Please follow up with Dr. Miller post operative day #14 (12/18/18) for wound check.  Weight bearing status - Continue to weight bear as tolerated. Physical therapy to help with exercise and help increase endurance.  Please keep dressings clean, dry, and intact. If dressing becomes wet, please pat dry. Have doctor remove dressings and any staples/sutures on POD #14 (12/18/18) and apply steri strips, if applicable.  DVT Prophylaxis - Aspirin 325 mg twice daily x 6 weeks.  . Please keep dressings clean/dry/intact.  Recommended to follow up with your primary care physician to discuss recent surgery and any potential changes to medications.

## 2018-12-05 NOTE — OCCUPATIONAL THERAPY INITIAL EVALUATION ADULT - LIVES WITH, PROFILE
Pt lives with niece and family in a pvt home with 3-4 steps to enter with a handrail. No stairs.  +Stall shower with shower chair. +Raised toilet seat/other relative

## 2018-12-05 NOTE — DISCHARGE NOTE ADULT - NS AS DC FU INST LIST INST
Date: 8/29/2017    Patient Name: Vanda Blankenship          To Whom it may concern: This letter has been written at the patient's request. The above patient was seen at the [facility] for treatment of a medical condition.     This patient shoul no

## 2018-12-05 NOTE — DISCHARGE NOTE ADULT - MEDICATION SUMMARY - MEDICATIONS TO TAKE
I will START or STAY ON the medications listed below when I get home from the hospital:    acetaminophen 325 mg oral tablet  -- 2 tab(s) by mouth every 6 hours, As needed, For Temp greater than 38 C (100.4 F), H/A, mild pain  -- Indication: For Fever H/A mild pain    oxyCODONE 5 mg oral tablet  -- 1 tab(s) by mouth every 4 hours, As needed, Moderate Pain (4 - 6) MDD:6  -- Indication: For Severe pain    traMADol 50 mg oral tablet  -- 1 tab(s) by mouth every 8 hours  -- Indication: For Moderate pain    aspirin 325 mg oral delayed release tablet  -- 1 tab(s) by mouth 2 times a day for 6 weeks post op MDD:2  -- Indication: For antiplatelet therapy, stay on this medication/dosage for 6 weeks post op    bisoprolol-hydrochlorothiazide 10 mg-6.25 mg oral tablet  -- 1 tab(s) by mouth once a day  -- Indication: For antihypertensive combination agent

## 2018-12-05 NOTE — DISCHARGE NOTE ADULT - PLAN OF CARE
Pain relief, improvement with activities of daily living. Please follow up with Dr. Miller 7-10 days after discharge from hospital.  Weight bearing status - Continue to weight bear as tolerated. Physical therapy to help with exercise and help increase endurance.  Please keep dressings clean, dry, and intact. If dressing becomes wet, please pat dry. Have doctor remove dressings and any staples/sutures on POD #14 (12/18/18) and apply steri strips, if applicable.  DVT Prophylaxis - Aspirin 325 mg twice daily x 6 weeks. Please follow up with Dr. Miller post operative day #14 (12/18/18) for wound check.  Weight bearing status - Continue to weight bear as tolerated. Physical therapy to help with exercise and help increase endurance.  Please keep dressings clean, dry, and intact. If dressing becomes wet, please pat dry. Have doctor remove dressings and any staples/sutures on POD #14 (12/18/18) and apply steri strips, if applicable.  DVT Prophylaxis - Aspirin 325 mg twice daily x 6 weeks.

## 2018-12-05 NOTE — DISCHARGE NOTE ADULT - CARE PLAN
Principal Discharge DX:	Primary osteoarthritis of left hip  Goal:	Pain relief, improvement with activities of daily living.  Assessment and plan of treatment:	Please follow up with Dr. Miller 7-10 days after discharge from hospital.  Weight bearing status - Continue to weight bear as tolerated. Physical therapy to help with exercise and help increase endurance.  Please keep dressings clean, dry, and intact. If dressing becomes wet, please pat dry. Have doctor remove dressings and any staples/sutures on POD #14 (12/18/18) and apply steri strips, if applicable.  DVT Prophylaxis - Aspirin 325 mg twice daily x 6 weeks. Principal Discharge DX:	Primary osteoarthritis of left hip  Goal:	Pain relief, improvement with activities of daily living.  Assessment and plan of treatment:	Please follow up with Dr. Miller post operative day #14 (12/18/18) for wound check.  Weight bearing status - Continue to weight bear as tolerated. Physical therapy to help with exercise and help increase endurance.  Please keep dressings clean, dry, and intact. If dressing becomes wet, please pat dry. Have doctor remove dressings and any staples/sutures on POD #14 (12/18/18) and apply steri strips, if applicable.  DVT Prophylaxis - Aspirin 325 mg twice daily x 6 weeks.

## 2018-12-05 NOTE — OCCUPATIONAL THERAPY INITIAL EVALUATION ADULT - ADDITIONAL COMMENTS
xray pelvis 12/4- Status post total left hip arthroplasty with transacetabular fixation screws. Alignment is intact. Redemonstration of partially threaded screw through the left superior acetabulum and superior pubic ramus. Old fracture deformities of the left acetabulum and pubic rami are visualized. There is mild right hip joint space narrowing. Sacroiliac joints and pubic symphysis are unremarkable. There are degenerative changes of the lower lumbar spine. Postoperative soft tissue air and   edema overlie the proximal left thigh.

## 2018-12-05 NOTE — DISCHARGE NOTE ADULT - HOSPITAL COURSE
History of Present Illness	  67y Female with h/o HTN , left acetabulum fracture s/p ORIF left acetabulum on 07/01/2018. Pt had f/u orthopedic consult- s/p CT left hip revealed osteoarthritis left hip. Pt scheduled for left total hip arthroplasty on 12/04/2018.     Allergies/Medications:   Allergies:        Allergies:  	No Known Drug Allergies:   	No Known Drug Allergies:   	Nuts: Food, Nausea, Rash  	Nuts: Food, Unknown    Home Medications:   * Patient Currently Takes Medications as of 26-Nov-2018 09:29 documented in Structured Notes  · 	acetaminophen 325 mg oral tablet: Last Dose Taken:  , 2 tab(s) orally every 6 hours, As needed, For Temp greater than 38 C (100.4 F)  · 	bisoprolol-hydrochlorothiazide 10 mg-6.25 mg oral tablet: Last Dose Taken:  , 1 tab(s) orally once a day  · 	traMADol 50 mg oral tablet:       12/4/18: Patient underwent elective L total hip replacement with Dr. Miller. Patient tolerated procedure well. Patient evaluated and treated by physical therapy whom recommended home with home physical therapy.   12/5/18: History of Present Illness	  67y Female with h/o HTN , left acetabulum fracture s/p ORIF left acetabulum on 07/01/2018. Pt had f/u orthopedic consult- s/p CT left hip revealed osteoarthritis left hip. Pt scheduled for left total hip arthroplasty on 12/04/2018.     Allergies/Medications:   Allergies:        Allergies:  	No Known Drug Allergies:   	No Known Drug Allergies:   	Nuts: Food, Nausea, Rash  	Nuts: Food, Unknown    Home Medications:   * Patient Currently Takes Medications as of 26-Nov-2018 09:29 documented in Structured Notes  · 	acetaminophen 325 mg oral tablet: Last Dose Taken:  , 2 tab(s) orally every 6 hours, As needed, For Temp greater than 38 C (100.4 F)  · 	bisoprolol-hydrochlorothiazide 10 mg-6.25 mg oral tablet: Last Dose Taken:  , 1 tab(s) orally once a day  · 	traMADol 50 mg oral tablet:       12/4/18: Patient underwent elective L total hip replacement with Dr. Miller. Patient tolerated procedure well. Patient evaluated and treated by physical therapy whom recommended home with home physical therapy.   12/5/18: Patient evaluated and treated by occupational therapy.   Remainder of hospital stay unremarkable. Patient stable and ready for discharge.

## 2018-12-05 NOTE — OCCUPATIONAL THERAPY INITIAL EVALUATION ADULT - BALANCE TRAINING, PT EVAL
Pt will increase dynamic standing balance 1/2 grade to increase safety/independence with functional transfers and ADLs within 1wk

## 2018-12-06 RX ORDER — LANOLIN ALCOHOL/MO/W.PET/CERES
5 CREAM (GRAM) TOPICAL AT BEDTIME
Qty: 0 | Refills: 0 | Status: DISCONTINUED | OUTPATIENT
Start: 2018-12-06 | End: 2018-12-07

## 2018-12-06 RX ADMIN — CELECOXIB 200 MILLIGRAM(S): 200 CAPSULE ORAL at 15:27

## 2018-12-06 RX ADMIN — Medication 30 MILLIGRAM(S): at 22:11

## 2018-12-06 RX ADMIN — Medication 5 MILLIGRAM(S): at 22:10

## 2018-12-06 RX ADMIN — Medication 975 MILLIGRAM(S): at 22:40

## 2018-12-06 RX ADMIN — Medication 325 MILLIGRAM(S): at 06:29

## 2018-12-06 RX ADMIN — Medication 100 MILLIGRAM(S): at 06:30

## 2018-12-06 RX ADMIN — Medication 30 MILLIGRAM(S): at 13:21

## 2018-12-06 RX ADMIN — Medication 975 MILLIGRAM(S): at 06:15

## 2018-12-06 RX ADMIN — Medication 30 MILLIGRAM(S): at 15:26

## 2018-12-06 RX ADMIN — Medication 12.5 MILLIGRAM(S): at 06:29

## 2018-12-06 RX ADMIN — CELECOXIB 200 MILLIGRAM(S): 200 CAPSULE ORAL at 13:22

## 2018-12-06 RX ADMIN — TRAMADOL HYDROCHLORIDE 50 MILLIGRAM(S): 50 TABLET ORAL at 22:10

## 2018-12-06 RX ADMIN — TRAMADOL HYDROCHLORIDE 50 MILLIGRAM(S): 50 TABLET ORAL at 06:15

## 2018-12-06 RX ADMIN — Medication 30 MILLIGRAM(S): at 06:15

## 2018-12-06 RX ADMIN — OXYCODONE HYDROCHLORIDE 10 MILLIGRAM(S): 5 TABLET ORAL at 04:27

## 2018-12-06 RX ADMIN — Medication 325 MILLIGRAM(S): at 17:27

## 2018-12-06 RX ADMIN — TRAMADOL HYDROCHLORIDE 50 MILLIGRAM(S): 50 TABLET ORAL at 06:45

## 2018-12-06 RX ADMIN — TRAMADOL HYDROCHLORIDE 50 MILLIGRAM(S): 50 TABLET ORAL at 22:40

## 2018-12-06 RX ADMIN — TRAMADOL HYDROCHLORIDE 50 MILLIGRAM(S): 50 TABLET ORAL at 17:16

## 2018-12-06 RX ADMIN — Medication 100 MILLIGRAM(S): at 22:11

## 2018-12-06 RX ADMIN — Medication 975 MILLIGRAM(S): at 22:10

## 2018-12-06 RX ADMIN — Medication 100 MILLIGRAM(S): at 13:21

## 2018-12-06 RX ADMIN — Medication 325 MILLIGRAM(S): at 13:22

## 2018-12-06 RX ADMIN — Medication 1 TABLET(S): at 13:22

## 2018-12-06 RX ADMIN — Medication 975 MILLIGRAM(S): at 15:26

## 2018-12-06 RX ADMIN — OXYCODONE HYDROCHLORIDE 10 MILLIGRAM(S): 5 TABLET ORAL at 03:57

## 2018-12-06 RX ADMIN — TRAMADOL HYDROCHLORIDE 50 MILLIGRAM(S): 50 TABLET ORAL at 15:33

## 2018-12-06 RX ADMIN — Medication 30 MILLIGRAM(S): at 06:45

## 2018-12-06 RX ADMIN — BISOPROLOL FUMARATE 10 MILLIGRAM(S): 10 TABLET, FILM COATED ORAL at 11:14

## 2018-12-06 RX ADMIN — Medication 30 MILLIGRAM(S): at 22:40

## 2018-12-06 RX ADMIN — Medication 975 MILLIGRAM(S): at 13:21

## 2018-12-06 RX ADMIN — Medication 975 MILLIGRAM(S): at 06:45

## 2018-12-06 NOTE — PROGRESS NOTE ADULT - SUBJECTIVE AND OBJECTIVE BOX
67F s/p left Total hip after failed acetabulum fx POD#3, Doing well, no complaints, denies CP/SOB/lightheadedness    Vital Signs Last 24 Hrs  T(C): 36.7 (06 Dec 2018 05:46), Max: 37 (05 Dec 2018 21:10)  T(F): 98 (06 Dec 2018 05:46), Max: 98.6 (05 Dec 2018 21:10)  HR: 67 (06 Dec 2018 05:46) (52 - 68)  BP: 119/58 (06 Dec 2018 05:46) (96/53 - 133/56)  BP(mean): --  RR: 16 (06 Dec 2018 05:46) (16 - 18)  SpO2: 100% (06 Dec 2018 05:46) (97% - 100%)    AAOx3   LLE   Compartments soft  EHL/FHL/TA/GS   L2-S1 SILT   DSG CDI     a/p   67 F s/p L Total hip POD  Pain control   WBAT   PT   DVT PPX   DC Planning 67F s/p left Total hip after failed acetabulum fx POD#3, Doing well, no complaints, denies CP/SOB/lightheadedness    Vital Signs Last 24 Hrs  T(C): 36.7 (06 Dec 2018 05:46), Max: 37 (05 Dec 2018 21:10)  T(F): 98 (06 Dec 2018 05:46), Max: 98.6 (05 Dec 2018 21:10)  HR: 67 (06 Dec 2018 05:46) (52 - 68)  BP: 119/58 (06 Dec 2018 05:46) (96/53 - 133/56)  BP(mean): --  RR: 16 (06 Dec 2018 05:46) (16 - 18)  SpO2: 100% (06 Dec 2018 05:46) (97% - 100%)    AAOx3   LLE   Compartments soft  EHL/FHL/TA/GS   L2-S1 SILT   DSG CDI     a/p   67 F s/p L Total hip POD 2  Pain control   WBAT   PT   DVT PPX   DC Planning

## 2018-12-07 VITALS
RESPIRATION RATE: 16 BRPM | OXYGEN SATURATION: 96 % | HEART RATE: 59 BPM | SYSTOLIC BLOOD PRESSURE: 110 MMHG | DIASTOLIC BLOOD PRESSURE: 59 MMHG | TEMPERATURE: 98 F

## 2018-12-07 PROCEDURE — 80048 BASIC METABOLIC PNL TOTAL CA: CPT

## 2018-12-07 PROCEDURE — C1889: CPT

## 2018-12-07 PROCEDURE — 97535 SELF CARE MNGMENT TRAINING: CPT

## 2018-12-07 PROCEDURE — 97530 THERAPEUTIC ACTIVITIES: CPT

## 2018-12-07 PROCEDURE — 97116 GAIT TRAINING THERAPY: CPT

## 2018-12-07 PROCEDURE — 97161 PT EVAL LOW COMPLEX 20 MIN: CPT

## 2018-12-07 PROCEDURE — C1776: CPT

## 2018-12-07 PROCEDURE — 72170 X-RAY EXAM OF PELVIS: CPT

## 2018-12-07 PROCEDURE — 85027 COMPLETE CBC AUTOMATED: CPT

## 2018-12-07 PROCEDURE — 82962 GLUCOSE BLOOD TEST: CPT

## 2018-12-07 PROCEDURE — C1713: CPT

## 2018-12-07 PROCEDURE — 97110 THERAPEUTIC EXERCISES: CPT

## 2018-12-07 PROCEDURE — 97165 OT EVAL LOW COMPLEX 30 MIN: CPT

## 2018-12-07 RX ORDER — ASPIRIN/CALCIUM CARB/MAGNESIUM 324 MG
1 TABLET ORAL
Qty: 80 | Refills: 0 | OUTPATIENT
Start: 2018-12-07 | End: 2019-01-15

## 2018-12-07 RX ORDER — OXYCODONE HYDROCHLORIDE 5 MG/1
1 TABLET ORAL
Qty: 42 | Refills: 0 | OUTPATIENT
Start: 2018-12-07 | End: 2018-12-13

## 2018-12-07 RX ORDER — TRAMADOL HYDROCHLORIDE 50 MG/1
1 TABLET ORAL
Qty: 0 | Refills: 0 | COMMUNITY
Start: 2018-12-07

## 2018-12-07 RX ORDER — TRAMADOL HYDROCHLORIDE 50 MG/1
1 TABLET ORAL
Qty: 0 | Refills: 0 | COMMUNITY

## 2018-12-07 RX ADMIN — TRAMADOL HYDROCHLORIDE 50 MILLIGRAM(S): 50 TABLET ORAL at 05:31

## 2018-12-07 RX ADMIN — Medication 100 MILLIGRAM(S): at 05:31

## 2018-12-07 RX ADMIN — Medication 325 MILLIGRAM(S): at 05:31

## 2018-12-07 RX ADMIN — Medication 1 TABLET(S): at 11:53

## 2018-12-07 RX ADMIN — Medication 975 MILLIGRAM(S): at 06:00

## 2018-12-07 RX ADMIN — Medication 325 MILLIGRAM(S): at 11:53

## 2018-12-07 RX ADMIN — Medication 30 MILLIGRAM(S): at 06:00

## 2018-12-07 RX ADMIN — Medication 975 MILLIGRAM(S): at 05:31

## 2018-12-07 RX ADMIN — OXYCODONE HYDROCHLORIDE 10 MILLIGRAM(S): 5 TABLET ORAL at 05:31

## 2018-12-07 RX ADMIN — CELECOXIB 200 MILLIGRAM(S): 200 CAPSULE ORAL at 11:53

## 2018-12-07 RX ADMIN — TRAMADOL HYDROCHLORIDE 50 MILLIGRAM(S): 50 TABLET ORAL at 06:00

## 2018-12-07 RX ADMIN — OXYCODONE HYDROCHLORIDE 10 MILLIGRAM(S): 5 TABLET ORAL at 04:27

## 2018-12-07 RX ADMIN — Medication 30 MILLIGRAM(S): at 05:31

## 2018-12-07 NOTE — PROGRESS NOTE ADULT - ATTENDING COMMENTS
Agree wth above exam and plan
Doing well, pain improved from preop. Agree with above exam and plan. F/u in 2-3wks for wound check and staple removal

## 2018-12-07 NOTE — PROGRESS NOTE ADULT - SUBJECTIVE AND OBJECTIVE BOX
67F s/p left Total hip after failed acetabulum fx POD#3, Doing well, no complaints, resting comfrotably, denies CP/SOB/lightheadedness    Vital Signs Last 24 Hrs  T(C): 36.6 (07 Dec 2018 05:01), Max: 36.9 (06 Dec 2018 21:28)  T(F): 97.9 (07 Dec 2018 05:01), Max: 98.4 (06 Dec 2018 21:28)  HR: 55 (07 Dec 2018 05:01) (52 - 69)  BP: 113/55 (07 Dec 2018 05:01) (113/55 - 139/55)  BP(mean): --  RR: 16 (07 Dec 2018 05:01) (16 - 16)  SpO2: 94% (07 Dec 2018 05:01) (94% - 100%)    AAOx3   LLE   Compartments soft  EHL/FHL/TA/GS   L2-S1 SILT   DSG CDI     a/p   67 F s/p L Total hip POD 3  Pain control   WBAT   PT   DVT PPX   DC home today

## 2018-12-10 PROBLEM — J18.9 PNEUMONIA, UNSPECIFIED ORGANISM: Chronic | Status: ACTIVE | Noted: 2018-11-26

## 2018-12-10 PROBLEM — M16.9 OSTEOARTHRITIS OF HIP, UNSPECIFIED: Chronic | Status: ACTIVE | Noted: 2018-11-26

## 2018-12-10 PROBLEM — J45.20 MILD INTERMITTENT ASTHMA, UNCOMPLICATED: Chronic | Status: ACTIVE | Noted: 2018-11-26

## 2018-12-10 PROBLEM — S32.409A: Chronic | Status: ACTIVE | Noted: 2018-11-26

## 2018-12-17 ENCOUNTER — APPOINTMENT (OUTPATIENT)
Dept: ORTHOPEDIC SURGERY | Facility: CLINIC | Age: 68
End: 2018-12-17

## 2018-12-19 ENCOUNTER — APPOINTMENT (OUTPATIENT)
Dept: ORTHOPEDIC SURGERY | Facility: CLINIC | Age: 68
End: 2018-12-19
Payer: MEDICARE

## 2018-12-19 DIAGNOSIS — Z86.79 PERSONAL HISTORY OF OTHER DISEASES OF THE CIRCULATORY SYSTEM: ICD-10-CM

## 2018-12-19 DIAGNOSIS — Z87.09 PERSONAL HISTORY OF OTHER DISEASES OF THE RESPIRATORY SYSTEM: ICD-10-CM

## 2018-12-19 PROCEDURE — 73502 X-RAY EXAM HIP UNI 2-3 VIEWS: CPT | Mod: LT

## 2018-12-19 PROCEDURE — 99024 POSTOP FOLLOW-UP VISIT: CPT

## 2018-12-19 RX ORDER — OXYCODONE AND ACETAMINOPHEN 5; 325 MG/1; MG/1
5-325 TABLET ORAL
Qty: 60 | Refills: 0 | Status: ACTIVE | COMMUNITY
Start: 2018-12-19 | End: 1900-01-01

## 2018-12-27 ENCOUNTER — APPOINTMENT (OUTPATIENT)
Dept: ORTHOPEDIC SURGERY | Facility: CLINIC | Age: 68
End: 2018-12-27

## 2019-01-17 ENCOUNTER — APPOINTMENT (OUTPATIENT)
Dept: ORTHOPEDIC SURGERY | Facility: CLINIC | Age: 69
End: 2019-01-17
Payer: MEDICARE

## 2019-01-17 PROCEDURE — 99024 POSTOP FOLLOW-UP VISIT: CPT

## 2019-01-17 PROCEDURE — 73502 X-RAY EXAM HIP UNI 2-3 VIEWS: CPT | Mod: LT

## 2019-01-17 RX ORDER — OXYCODONE 5 MG/1
5 TABLET ORAL
Qty: 90 | Refills: 0 | Status: ACTIVE | COMMUNITY
Start: 2019-01-17 | End: 1900-01-01

## 2019-01-17 NOTE — HISTORY OF PRESENT ILLNESS
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  [de-identified] : Physical exam of the  Left Hip: There is a well healed surgical incision with no erythema or drainage with [place] in place. There are no signs of infection and normal post operative edema with resolving ecchymosis. They have  80 degrees of hip flexion,  30 degrees Abduction, 0 degrees adduction 20 degrees external rotation, 30 degrees internal rotation. They has 5/5 strength  with resistive flexion, extension and abduction compared to the contralateral side.  There is a normal neurovascular exam with 2+ distal pulses\par  [de-identified] : AP and lateral left hip taken today and reviewed by me show a well fixed and appropriately positioned left total hip replacement in good position no signs of mechancial failure  [de-identified] : 6 weeks S/P left total hip arthroplasty for post traumatic arthritis by Dr Miller  [de-identified] : - continue WBAT \par - DC posterior hip precautions \par - f/u in 6 weeks \par - New PT script given

## 2019-01-30 ENCOUNTER — INBOUND DOCUMENT (OUTPATIENT)
Age: 69
End: 2019-01-30

## 2019-02-14 RX ORDER — TRAMADOL HYDROCHLORIDE 50 MG/1
50 TABLET, COATED ORAL
Qty: 50 | Refills: 0 | Status: ACTIVE | COMMUNITY
Start: 2019-02-14 | End: 1900-01-01

## 2019-03-18 ENCOUNTER — APPOINTMENT (OUTPATIENT)
Dept: ORTHOPEDIC SURGERY | Facility: CLINIC | Age: 69
End: 2019-03-18
Payer: MEDICARE

## 2019-03-18 DIAGNOSIS — S32.492D OTHER SPECIFIED FRACTURE OF LEFT ACETABULUM, SUBSEQUENT ENCOUNTER FOR FRACTURE WITH ROUTINE HEALING: ICD-10-CM

## 2019-03-18 DIAGNOSIS — Z96.642 PRESENCE OF LEFT ARTIFICIAL HIP JOINT: ICD-10-CM

## 2019-03-18 PROCEDURE — 99214 OFFICE O/P EST MOD 30 MIN: CPT

## 2019-03-18 PROCEDURE — 73502 X-RAY EXAM HIP UNI 2-3 VIEWS: CPT | Mod: LT

## 2019-03-18 NOTE — HISTORY OF PRESENT ILLNESS
[de-identified] : 12 weeks S/P left total hip arthroplasty for post traumatic arthritis  [de-identified] : 12weeks S/P left total hip arthroplasty for post traumatic arthriti. She presents today for followup doing well post operatively  [de-identified] : Physical exam of the  Left Hip: There is a well healed surgical incision with no erythema or drainage . \par SILT TN SPN DPN SN \par 5/5 TA PT GS EHL FHL \par +4/5 Quads/ hamstring [de-identified] : AP and lateral left hip taken today and reviewed by me show a well fixed and appropriately positioned left total hip replacement in good position no signs of mechanical failure  [de-identified] : 12 weeks S/P left total hip arthroplasty for post traumatic arthritis  [de-identified] : - continue WBAT \par - DC posterior hip precautions \par - f/u in 12 weeks \par - New PT script given

## 2019-04-10 ENCOUNTER — TRANSCRIPTION ENCOUNTER (OUTPATIENT)
Age: 69
End: 2019-04-10

## 2019-04-10 ENCOUNTER — EMERGENCY (EMERGENCY)
Facility: HOSPITAL | Age: 69
LOS: 1 days | Discharge: HOME | End: 2019-04-10
Attending: EMERGENCY MEDICINE
Payer: MEDICARE

## 2019-04-10 VITALS
TEMPERATURE: 98 F | DIASTOLIC BLOOD PRESSURE: 69 MMHG | RESPIRATION RATE: 18 BRPM | WEIGHT: 164.91 LBS | HEART RATE: 62 BPM | OXYGEN SATURATION: 98 % | SYSTOLIC BLOOD PRESSURE: 151 MMHG

## 2019-04-10 DIAGNOSIS — Z98.89 OTHER SPECIFIED POSTPROCEDURAL STATES: Chronic | ICD-10-CM

## 2019-04-10 DIAGNOSIS — Z96.7 PRESENCE OF OTHER BONE AND TENDON IMPLANTS: Chronic | ICD-10-CM

## 2019-04-10 DIAGNOSIS — S62.109A FRACTURE OF UNSPECIFIED CARPAL BONE, UNSPECIFIED WRIST, INITIAL ENCOUNTER FOR CLOSED FRACTURE: Chronic | ICD-10-CM

## 2019-04-10 LAB
ALBUMIN SERPL ELPH-MCNC: 3.6 G/DL — SIGNIFICANT CHANGE UP (ref 3.3–5.2)
ALP SERPL-CCNC: 129 U/L — HIGH (ref 40–120)
ALT FLD-CCNC: 18 U/L — SIGNIFICANT CHANGE UP
ANION GAP SERPL CALC-SCNC: 11 MMOL/L — SIGNIFICANT CHANGE UP (ref 5–17)
AST SERPL-CCNC: 26 U/L — SIGNIFICANT CHANGE UP
BASOPHILS # BLD AUTO: 0 K/UL — SIGNIFICANT CHANGE UP (ref 0–0.2)
BASOPHILS NFR BLD AUTO: 0.2 % — SIGNIFICANT CHANGE UP (ref 0–2)
BILIRUB SERPL-MCNC: 0.5 MG/DL — SIGNIFICANT CHANGE UP (ref 0.4–2)
BUN SERPL-MCNC: 19 MG/DL — SIGNIFICANT CHANGE UP (ref 8–20)
CALCIUM SERPL-MCNC: 9.2 MG/DL — SIGNIFICANT CHANGE UP (ref 8.6–10.2)
CHLORIDE SERPL-SCNC: 100 MMOL/L — SIGNIFICANT CHANGE UP (ref 98–107)
CO2 SERPL-SCNC: 27 MMOL/L — SIGNIFICANT CHANGE UP (ref 22–29)
CREAT SERPL-MCNC: 0.8 MG/DL — SIGNIFICANT CHANGE UP (ref 0.5–1.3)
EOSINOPHIL # BLD AUTO: 0 K/UL — SIGNIFICANT CHANGE UP (ref 0–0.5)
EOSINOPHIL NFR BLD AUTO: 0.1 % — SIGNIFICANT CHANGE UP (ref 0–6)
GLUCOSE SERPL-MCNC: 132 MG/DL — HIGH (ref 70–115)
HCT VFR BLD CALC: 38 % — SIGNIFICANT CHANGE UP (ref 37–47)
HGB BLD-MCNC: 12.4 G/DL — SIGNIFICANT CHANGE UP (ref 12–16)
LYMPHOCYTES # BLD AUTO: 1.7 K/UL — SIGNIFICANT CHANGE UP (ref 1–4.8)
LYMPHOCYTES # BLD AUTO: 15.2 % — LOW (ref 20–55)
MCHC RBC-ENTMCNC: 29.3 PG — SIGNIFICANT CHANGE UP (ref 27–31)
MCHC RBC-ENTMCNC: 32.6 G/DL — SIGNIFICANT CHANGE UP (ref 32–36)
MCV RBC AUTO: 89.8 FL — SIGNIFICANT CHANGE UP (ref 81–99)
MONOCYTES # BLD AUTO: 0.8 K/UL — SIGNIFICANT CHANGE UP (ref 0–0.8)
MONOCYTES NFR BLD AUTO: 7.5 % — SIGNIFICANT CHANGE UP (ref 3–10)
NEUTROPHILS # BLD AUTO: 8.5 K/UL — HIGH (ref 1.8–8)
NEUTROPHILS NFR BLD AUTO: 76.8 % — HIGH (ref 37–73)
PLATELET # BLD AUTO: 288 K/UL — SIGNIFICANT CHANGE UP (ref 150–400)
POTASSIUM SERPL-MCNC: 3.9 MMOL/L — SIGNIFICANT CHANGE UP (ref 3.5–5.3)
POTASSIUM SERPL-SCNC: 3.9 MMOL/L — SIGNIFICANT CHANGE UP (ref 3.5–5.3)
PROT SERPL-MCNC: 6.5 G/DL — LOW (ref 6.6–8.7)
RBC # BLD: 4.23 M/UL — LOW (ref 4.4–5.2)
RBC # FLD: 13.1 % — SIGNIFICANT CHANGE UP (ref 11–15.6)
SODIUM SERPL-SCNC: 138 MMOL/L — SIGNIFICANT CHANGE UP (ref 135–145)
WBC # BLD: 11 K/UL — HIGH (ref 4.8–10.8)
WBC # FLD AUTO: 11 K/UL — HIGH (ref 4.8–10.8)

## 2019-04-10 PROCEDURE — 73552 X-RAY EXAM OF FEMUR 2/>: CPT | Mod: 26,LT

## 2019-04-10 PROCEDURE — 73502 X-RAY EXAM HIP UNI 2-3 VIEWS: CPT | Mod: 26,LT

## 2019-04-10 PROCEDURE — 99285 EMERGENCY DEPT VISIT HI MDM: CPT

## 2019-04-10 RX ORDER — MORPHINE SULFATE 50 MG/1
2 CAPSULE, EXTENDED RELEASE ORAL ONCE
Qty: 0 | Refills: 0 | Status: DISCONTINUED | OUTPATIENT
Start: 2019-04-10 | End: 2019-04-10

## 2019-04-10 RX ORDER — SODIUM CHLORIDE 9 MG/ML
1000 INJECTION INTRAMUSCULAR; INTRAVENOUS; SUBCUTANEOUS
Qty: 0 | Refills: 0 | Status: DISCONTINUED | OUTPATIENT
Start: 2019-04-10 | End: 2019-04-15

## 2019-04-10 RX ADMIN — SODIUM CHLORIDE 150 MILLILITER(S): 9 INJECTION INTRAMUSCULAR; INTRAVENOUS; SUBCUTANEOUS at 22:15

## 2019-04-10 RX ADMIN — MORPHINE SULFATE 2 MILLIGRAM(S): 50 CAPSULE, EXTENDED RELEASE ORAL at 22:15

## 2019-04-10 NOTE — CONSULT NOTE ADULT - SUBJECTIVE AND OBJECTIVE BOX
Pt Name: RAQUEL ESCOBAR    MRN: [id number]      Patient is a 68y Female presenting to the emergency department with a chief complaint of left hip/thigh pain.  Patient states at around 16:00 hours she was attempting to get into bed when she slipped on her flood and landed on the outside of her left thigh.  She was taken by ambulance to the ED.  Patient states most of her pain is around the thigh area and has noticed increased swelling to her thigh.  She denies bruising.  There is no numbness/tingling or pain radiating down her left lower extremity.  She is unable to bear weight secondary to pain.  Patient is s/p left hip replacement (Dec 4th 2018), L acetabulum ORIF (open reduction internal fixation) fracture (July 2018) which were both managed by Dr. Miller at [facility].  She has no other orthopedic complaints at this time      REVIEW OF SYSTEMS    General: Alert, responsive, in NAD    Skin/Breast: No rashes, no pruritis   	  Ophthalmologic: No visual changes. No redness.   	  ENMT:	No discharge. No swelling.    Respiratory and Thorax: No difficulty breathing. No cough.  	   Cardiovascular:	No chest pain. No palpitations.    Gastrointestinal:	 No abdominal pain. No diarrhea.     Genitourinary: No dysuria. No bleeding.    Musculoskeletal: SEE HPI.    Neurological: No sensory or motor changes.     Psychiatric: No anxiety or depression.    Hematology/Lymphatics: No swelling.    Endocrine: No Hx of diabetes.    ROS is otherwise negative.    PAST MEDICAL & SURGICAL HISTORY:  PAST MEDICAL & SURGICAL HISTORY:  Mild intermittent asthma, unspecified whether complicated: denies ED admission or ED admission- denies any use of brochodilators x 3 years  Pneumonia: 2015  OA (osteoarthritis) of hip: Left hip  Fracture acetabulum-closed: 06/30/2018  Hypertension  S/P ORIF (open reduction internal fixation) fracture: Left acetabulum 07/01/2108  Fx wrist  Status post wrist surgery      Allergies: No Known Drug Allergies  Nuts (Nausea; Rash)  Nuts (Unknown)      Medications: sodium chloride 0.9%. 1000 milliLiter(s) IV Continuous <Continuous>      FAMILY HISTORY:  No pertinent family history in first degree relatives  : non-contributory    Social History:     Ambulation: Bedbound [x]                           12.4   11.0  )-----------( 288      ( 10 Apr 2019 22:27 )             38.0       04-10    138  |  100  |  19.0  ----------------------------<  132<H>  3.9   |  27.0  |  0.80    Ca    9.2      10 Apr 2019 22:27    TPro  6.5<L>  /  Alb  3.6  /  TBili  0.5  /  DBili  x   /  AST  26  /  ALT  18  /  AlkPhos  129<H>  04-10      Vital Signs Last 24 Hrs  T(C): 36.8 (10 Apr 2019 19:33), Max: 36.8 (10 Apr 2019 19:33)  T(F): 98.3 (10 Apr 2019 19:33), Max: 98.3 (10 Apr 2019 19:33)  HR: 65 (10 Apr 2019 19:33) (62 - 65)  BP: 144/73 (10 Apr 2019 19:33) (144/73 - 151/69)  BP(mean): --  RR: 20 (10 Apr 2019 19:33) (18 - 20)  SpO2: 100% (10 Apr 2019 19:33) (98% - 100%)    Daily     Daily       PHYSICAL EXAM:  Appearance: Alert, responsive, in no acute distresss  Musculoskeletal:       Left Lower Extremity:              Inspection:  left leg is shortened and externally rotated.  Mild-moderate amount of swelling noted to the thigh.  No evidence of bleeding, abrasions or ulcerations.  No ecchymosis noted through out the LLE.  No visible rash on skin.  Skin is c/d/i              Palpation:  Diffuse tenderness noted through out the left thigh.  No anterior hip tenderness.  No other bony tenderness through out the pelvis/knee              Range of motion:  ROM of the hip deferred secondary to fracture.  Limited range of motion of the knee secondary to pain.  AFROM of the ankle and toes              Strength:  5/5 strength ankle dorsiflexion/plantarflexion, EHL/FHL.  Strength testing of the hip/knee deferred secondary to fracture/pain             Neurological: Sensation is grossly intact to light touch.  No focal deficits or weaknesses found.             Vascular: 2+ distal pulses. Cap refill < 2 sec. No signs of venous insufficiency or stasis. No extremity ulcerations. No cyanosis.        Imaging Studies:        A/P:  Pt is a  68y Female with left hip/thigh pain found to have a left periprosthetic fracture    PLAN:   * Findings and treatment options were discussed with the patient  * X-rays were reviewed and discussed  * Continue Bed rest  * Fracture will need surgical intervention - patient is requesting transfer to Dr. Garsia's care for surgery.  Case discussed with Dr. Harris who will call over to [facility] to discuss transfer of care  *  If patient is not transferred by tomorrow morning; recommend trauma consult as well  * Case discussed with Dr. Iverson Pt Name: RAQUEL ESCOBAR    MRN: [id number]      Patient is a 68y Female presenting to the emergency department with a chief complaint of left hip/thigh pain.  Patient states at around 16:00 hours she was attempting to get into bed when she slipped on her flood and landed on the outside of her left thigh.  She was taken by ambulance to the ED.  Patient states most of her pain is around the thigh area and has noticed increased swelling to her thigh.  She denies bruising.  There is no numbness/tingling or pain radiating down her left lower extremity.  She is unable to bear weight secondary to pain.  Patient is s/p left hip replacement (Dec 4th 2018), L acetabulum ORIF (open reduction internal fixation) fracture (July 2018) which were both managed by Dr. Miller at [facility]  She has no other orthopedic complaints at this time      REVIEW OF SYSTEMS    General: Alert, responsive, in NAD    Skin/Breast: No rashes, no pruritis   	  Ophthalmologic: No visual changes. No redness.   	  ENMT:	No discharge. No swelling.    Respiratory and Thorax: No difficulty breathing. No cough.  	   Cardiovascular:	No chest pain. No palpitations.    Gastrointestinal:	 No abdominal pain. No diarrhea.     Genitourinary: No dysuria. No bleeding.    Musculoskeletal: SEE HPI.    Neurological: No sensory or motor changes.     Psychiatric: No anxiety or depression.    Hematology/Lymphatics: No swelling.    Endocrine: No Hx of diabetes.    ROS is otherwise negative.    PAST MEDICAL & SURGICAL HISTORY:  PAST MEDICAL & SURGICAL HISTORY:  Mild intermittent asthma, unspecified whether complicated: denies ED admission or ED admission- denies any use of brochodilators x 3 years  Pneumonia: 2015  OA (osteoarthritis) of hip: Left hip  Fracture acetabulum-closed: 06/30/2018  Hypertension  S/P ORIF (open reduction internal fixation) fracture: Left acetabulum 07/01/2108  Fx wrist  Status post wrist surgery      Allergies: No Known Drug Allergies  Nuts (Nausea; Rash)  Nuts (Unknown)      Medications: sodium chloride 0.9%. 1000 milliLiter(s) IV Continuous <Continuous>      FAMILY HISTORY:  No pertinent family history in first degree relatives  : non-contributory    Social History:     Ambulation: Bedbound [x]                           12.4   11.0  )-----------( 288      ( 10 Apr 2019 22:27 )             38.0       04-10    138  |  100  |  19.0  ----------------------------<  132<H>  3.9   |  27.0  |  0.80    Ca    9.2      10 Apr 2019 22:27    TPro  6.5<L>  /  Alb  3.6  /  TBili  0.5  /  DBili  x   /  AST  26  /  ALT  18  /  AlkPhos  129<H>  04-10      Vital Signs Last 24 Hrs  T(C): 36.8 (10 Apr 2019 19:33), Max: 36.8 (10 Apr 2019 19:33)  T(F): 98.3 (10 Apr 2019 19:33), Max: 98.3 (10 Apr 2019 19:33)  HR: 65 (10 Apr 2019 19:33) (62 - 65)  BP: 144/73 (10 Apr 2019 19:33) (144/73 - 151/69)  BP(mean): --  RR: 20 (10 Apr 2019 19:33) (18 - 20)  SpO2: 100% (10 Apr 2019 19:33) (98% - 100%)    Daily     Daily       PHYSICAL EXAM:  Appearance: Alert, responsive, in no acute distresss  Musculoskeletal:       Left Lower Extremity:              Inspection:  left leg is shortened and externally rotated.  Mild-moderate amount of swelling noted to the thigh.  No evidence of bleeding, abrasions or ulcerations.  No ecchymosis noted through out the LLE.  No visible rash on skin.  Skin is c/d/i              Palpation:  Diffuse tenderness noted through out the left thigh.  No anterior hip tenderness.  No other bony tenderness through out the pelvis/knee              Range of motion:  ROM of the hip deferred secondary to fracture.  Limited range of motion of the knee secondary to pain.  AFROM of the ankle and toes              Strength:  5/5 strength ankle dorsiflexion/plantarflexion, EHL/FHL.  Strength testing of the hip/knee deferred secondary to fracture/pain             Neurological: Sensation is grossly intact to light touch.  No focal deficits or weaknesses found.             Vascular: 2+ distal pulses. Cap refill < 2 sec. No signs of venous insufficiency or stasis. No extremity ulcerations. No cyanosis.        Imaging Studies:        A/P:  Pt is a  68y Female with left hip/thigh pain found to have a left periprosthetic fracture    PLAN:   * Findings and treatment options were discussed with the patient  * X-rays were reviewed and discussed  * Continue Bed rest  * Fracture will need surgical intervention - patient is requesting transfer to Dr. Miller' care for surgery.  Case discussed with Dr. Harris who will call over to [facility] to discuss transfer of care  *  If patient is not transferred by tomorrow morning; recommend trauma consult as well  * Case discussed with Dr. Iverson          Addendum:  d/w Dr. Harris - transfer accepted Pt Name: RAQUEL ESCOBAR    MRN: [id number]      Patient is a 68y Female presenting to the emergency department with a chief complaint of left hip/thigh pain.  Patient states at around 16:00 hours she was attempting to get into bed when she slipped on her flood and landed on the outside of her left thigh.  She was taken by ambulance to the ED.  Patient states most of her pain is around the thigh area and has noticed increased swelling to her thigh.  She denies bruising.  There is no numbness/tingling or pain radiating down her left lower extremity.  She is unable to bear weight secondary to pain.  Patient is s/p left hip replacement (Dec 4th 2018), L acetabulum ORIF (open reduction internal fixation) fracture (July 2018) which were both managed by Dr. Miller at [facility].  She has no other orthopedic complaints at this time      REVIEW OF SYSTEMS    General: Alert, responsive, in NAD    Skin/Breast: No rashes, no pruritis   	  Ophthalmologic: No visual changes. No redness.   	  ENMT:	No discharge. No swelling.    Respiratory and Thorax: No difficulty breathing. No cough.  	   Cardiovascular:	No chest pain. No palpitations.    Gastrointestinal:	 No abdominal pain. No diarrhea.     Genitourinary: No dysuria. No bleeding.    Musculoskeletal: SEE HPI.    Neurological: No sensory or motor changes.     Psychiatric: No anxiety or depression.    Hematology/Lymphatics: No swelling.    Endocrine: No Hx of diabetes.    ROS is otherwise negative.    PAST MEDICAL & SURGICAL HISTORY:  PAST MEDICAL & SURGICAL HISTORY:  Mild intermittent asthma, unspecified whether complicated: denies ED admission or ED admission- denies any use of brochodilators x 3 years  Pneumonia: 2015  OA (osteoarthritis) of hip: Left hip  Fracture acetabulum-closed: 06/30/2018  Hypertension  S/P ORIF (open reduction internal fixation) fracture: Left acetabulum 07/01/2108  Fx wrist  Status post wrist surgery      Allergies: No Known Drug Allergies  Nuts (Nausea; Rash)  Nuts (Unknown)      Medications: sodium chloride 0.9%. 1000 milliLiter(s) IV Continuous <Continuous>      FAMILY HISTORY:  No pertinent family history in first degree relatives  : non-contributory    Social History:     Ambulation: Bedbound [x]                           12.4   11.0  )-----------( 288      ( 10 Apr 2019 22:27 )             38.0       04-10    138  |  100  |  19.0  ----------------------------<  132<H>  3.9   |  27.0  |  0.80    Ca    9.2      10 Apr 2019 22:27    TPro  6.5<L>  /  Alb  3.6  /  TBili  0.5  /  DBili  x   /  AST  26  /  ALT  18  /  AlkPhos  129<H>  04-10      Vital Signs Last 24 Hrs  T(C): 36.8 (10 Apr 2019 19:33), Max: 36.8 (10 Apr 2019 19:33)  T(F): 98.3 (10 Apr 2019 19:33), Max: 98.3 (10 Apr 2019 19:33)  HR: 65 (10 Apr 2019 19:33) (62 - 65)  BP: 144/73 (10 Apr 2019 19:33) (144/73 - 151/69)  BP(mean): --  RR: 20 (10 Apr 2019 19:33) (18 - 20)  SpO2: 100% (10 Apr 2019 19:33) (98% - 100%)    Daily     Daily       PHYSICAL EXAM:  Appearance: Alert, responsive, in no acute distresss  Musculoskeletal:       Left Lower Extremity:              Inspection:  left leg is shortened and externally rotated.  Mild-moderate amount of swelling noted to the thigh.  No evidence of bleeding, abrasions or ulcerations.  No ecchymosis noted through out the LLE.  No visible rash on skin.  Skin is c/d/i              Palpation:  Diffuse tenderness noted through out the left thigh.  No anterior hip tenderness.  No other bony tenderness through out the pelvis/knee              Range of motion:  ROM of the hip deferred secondary to fracture.  Limited range of motion of the knee secondary to pain.  AFROM of the ankle and toes              Strength:  5/5 strength ankle dorsiflexion/plantarflexion, EHL/FHL.  Strength testing of the hip/knee deferred secondary to fracture/pain             Neurological: Sensation is grossly intact to light touch.  No focal deficits or weaknesses found.             Vascular: 2+ distal pulses. Cap refill < 2 sec. No signs of venous insufficiency or stasis. No extremity ulcerations. No cyanosis.        Imaging Studies:  X-rays of the femur and pelvis shows a left periprosthetic fracture; official report pending      A/P:  Pt is a  68y Female with left hip/thigh pain found to have a left periprosthetic fracture    PLAN:   * Findings and treatment options were discussed with the patient  * X-rays were reviewed and discussed  * Continue Bed rest  * Fracture will need surgical intervention - patient is requesting transfer to Dr. Miller' care for surgery.  Case discussed with Dr. Harris who will call over to [facility] to discuss transfer of care  *  If patient is not transferred by tomorrow morning; recommend trauma consult as well  * Case discussed with Dr. Iverson          Addendum:  d/w Dr. Harris - transfer accepted

## 2019-04-10 NOTE — ED PROVIDER NOTE - NSREASONFORTRANSFER_ED_A_ED
Patient Request/Pre-existing Relationship/Consultant Request/Higher Level of Care or Service Not Available

## 2019-04-10 NOTE — ED ADULT NURSE NOTE - OBJECTIVE STATEMENT
assumed pt care at 2030. Pt A&O x 4. States she had a left total hip done about a month ago and had a fall today while bending over to tie shoes. Pt presents with no visible deformity but swelling to left leg. Pulses present B/L. Pt c/o pain on left leg from knee to groin. Denies any other symptoms at this time. Safety maintained. Will continue to monitor.

## 2019-04-10 NOTE — ED PROVIDER NOTE - CLINICAL SUMMARY MEDICAL DECISION MAKING FREE TEXT BOX
69 y/o F with hip replacement secondary to fracture in January with Dr. Miller, presents to ED with slip from sitting position in bed, c/o hip pain, pt took oxycodone PTA ED today, plan to do x-ray, pain control, ortho consult, and reassess.

## 2019-04-10 NOTE — ED PROVIDER NOTE - PHYSICAL EXAMINATION
Constitutional : Appears comfortably, talking in full sentences  Head :NC AT , no swelling  Eyes :eomi, no swelling  Mouth :mm moist,  Neck : supple, trachea in midline  Chest :Min air entry, symm chest expansion, no distress  Heart :S1 S2 distant  Abdomen :abd soft, non tender  Musc/Skel :ext no swelling, no deformity, no spine tenderness, distal pulses present  Neuro  :AAO 3 no focal deficits Constitutional : Appears comfortably, talking in full sentences  Head :NC AT , no swelling  Eyes :eomi, no swelling  Mouth :mm moist,  Neck : supple, trachea in midline  Chest :Min air entry, symm chest expansion, no distress  Heart :S1 S2 distant  Abdomen :abd soft, non tender  Musc/Skel :ext no swelling, no deformity, no spine tenderness, distal pulses present  Neuro  :AAO 3 no focal deficits  uncomfrotable  dry    L groin apin L lateral HI exam  no swelling  LLW externaly rotates, knee flexed, distal in inury sensoyr mtor and cap intact  no lumbar spine tenderness  R L nml sitting uncomfortably, talking in full sentences  pupils reactive, eomi,   mm dry, no oral injury, no facial pain  supple, no c/t/l spine tenderness, from, trachea in midline  Min chest expansion, no cheat wall tenderness, no rib tenderness, no deformity, no clavicular pain  S1 S2 distant  abd soft, non tender, no g/r,   no pelvic pain  L groin tenderness, L lateral hip tenderness on exam no swelling  LLE externally rotated, knee flexed, distal to injury sensory motor and cap refill intact  RLE normal.  Neuro aao3, cn intact grossly, no focal deficits  skin no bruises, no swelling

## 2019-04-10 NOTE — ED ADULT NURSE REASSESSMENT NOTE - NS ED NURSE REASSESS COMMENT FT1
received pt at this time from ongoing shift. pt a&ox3 states + relief of pain s/p pain meds. LLE shortened and externally rotated. + distal pulses, skin warm and dry, sensation intact, cap refil < 3, toes warm and mobile. ortho at bedside for consult. pending further tx plan. updated on plan of care, verbalize understanding. call bell in reach

## 2019-04-10 NOTE — ED PROVIDER NOTE - NS ED ROS FT
no weight change, no fever or chills  no recent travel, no recent abox, no sick contacts  no recent change in medications  no rash, no bruises  no visual changes no eye discharge  no cough cold or congestion,   no sob, no chest pain  follows with cardiology  no stress test, no cath  no orthopnea, no pnd  no abd pain, no n/v/d  no endoscopy, no colonoscopy  no hematuria, no change in urinary habits  (+) hip pain, no joint pain, no deformity  no headache, no paresthesia no weight change, no fever or chills  no recent travel, no recent abox, no sick contacts  no recent change in medications  no rash, no bruises  no visual changes no eye discharge  no cough cold or congestion,   no sob, no chest pain  no stress test, no cath  no orthopnea, no pnd  no abd pain, no n/v/d  no endoscopy, no colonoscopy  no hematuria, no change in urinary habits  (+) hip pain, no joint pain, no deformity  no headache, no paresthesia

## 2019-04-10 NOTE — ED PROVIDER NOTE - OBJECTIVE STATEMENT
67 y/o F pt with PMHx HTN, PNA, asthma, hip replacement (Dec 4th 2018), L acetabulum ORIF (open reduction internal fixation) fracture (July 2018), wrist fracture, presents to the ED c/o L hip pain s/p falling today.  Pt was sitting on her bed putting her shoes on when her bottom slipped of the bed and she landed on the ground on her L side.  Pt reports L hip pain since the fall.  Pt had L acetabulum fracture in July 2018, then had L hip replacement in Dec 2018.  No daily meds.  Denies LOC, neck pain, UE pain, fever, chills, N/V.  No further acute complaints at this time.  Surgeon: Dr. Miller, [facility] 67 y/o F pt with PMHx HTN, PNA, asthma, hip replacement (Dec 4th 2018), L acetabulum ORIF (open reduction internal fixation) fracture (July 2018), wrist fracture, presents to the ED c/o L hip pain s/p falling today.  Pt was sitting on her bed putting her shoes on when her bottom slipped of the bed and she landed on the ground on her L side.  She was unable to stand up on her own, called her bother for help.  Pt reports L hip pain since the fall.  She took oxycodone 5mg x2 PTA today.  Her last meal was approx 15:00 today.  Pt had L acetabulum fracture in July 2018, then had L hip replacement in Dec 2018.  Pt has had an ECHO previously, resulted normal.  No daily meds.  Denies LOC, neck pain, UE pain, fever, chills, N/V.  No further acute complaints at this time.  Surgeon: Dr. Miller, [facility] Hx source: pt  67 y/o F pt with PMHx HTN, PNA, asthma, hip replacement (Dec 4th 2018), L acetabulum ORIF (open reduction internal fixation) fracture (July 2018), wrist fracture, presents to the ED c/o L hip pain s/p falling today.  Pt was sitting on her bed putting her shoes on when her bottom slipped of the bed and she landed on the ground on her L side.  She was unable to stand up on her own, called her bother for help.  Pt reports L hip pain since the fall.  She took oxycodone 5mg x2 PTA today.  Her last meal was approx. 15:00 today.  Pt had L acetabulum fracture in July 2018, then had L hip replacement in Jan 2018.  Pt has had an ECHO previously, resulted normal.  No daily meds.  Denies LOC, neck pain, UE pain, fever, chills, N/V.  No further acute complaints at this time.  Surgeon: Dr. Miller, [facility]

## 2019-04-10 NOTE — ED ADULT TRIAGE NOTE - CHIEF COMPLAINT QUOTE
Patient BIBA, states tripped and fell onto left leg, complains of pain to left leg, hx of hip replacement on left side, denies hitting head or LOC, denies blood thinners

## 2019-04-10 NOTE — ED ADULT NURSE NOTE - NSIMPLEMENTINTERV_GEN_ALL_ED
Implemented All Fall Risk Interventions:  [place] to call system. Call bell, personal items and telephone within reach. Instruct patient to call for assistance. Room bathroom lighting operational. Non-slip footwear when patient is off stretcher. Physically safe environment: no spills, clutter or unnecessary equipment. Stretcher in lowest position, wheels locked, appropriate side rails in place. Provide visual cue, wrist band, yellow gown, etc. Monitor gait and stability. Monitor for mental status changes and reorient to person, place, and time. Review medications for side effects contributing to fall risk. Reinforce activity limits and safety measures with patient and family.

## 2019-04-10 NOTE — ED PROVIDER NOTE - PROGRESS NOTE DETAILS
discussed x-ray finding with pt, discussed recommendation to transfer to [place] to continue care with Dr. Miller, pt agrees with plan to transfer.

## 2019-04-11 ENCOUNTER — INPATIENT (INPATIENT)
Facility: HOSPITAL | Age: 69
LOS: 4 days | Discharge: NURSING, REHAB OR LONG-TERM CARE | DRG: 481 | End: 2019-04-16
Attending: ORTHOPAEDIC SURGERY | Admitting: ORTHOPAEDIC SURGERY
Payer: MEDICARE

## 2019-04-11 VITALS
DIASTOLIC BLOOD PRESSURE: 64 MMHG | HEART RATE: 68 BPM | OXYGEN SATURATION: 100 % | RESPIRATION RATE: 16 BRPM | TEMPERATURE: 98 F | SYSTOLIC BLOOD PRESSURE: 118 MMHG

## 2019-04-11 VITALS
RESPIRATION RATE: 18 BRPM | HEART RATE: 70 BPM | TEMPERATURE: 98 F | OXYGEN SATURATION: 99 % | SYSTOLIC BLOOD PRESSURE: 157 MMHG | DIASTOLIC BLOOD PRESSURE: 62 MMHG

## 2019-04-11 DIAGNOSIS — S72.90XA UNSPECIFIED FRACTURE OF UNSPECIFIED FEMUR, INITIAL ENCOUNTER FOR CLOSED FRACTURE: ICD-10-CM

## 2019-04-11 DIAGNOSIS — R52 PAIN, UNSPECIFIED: ICD-10-CM

## 2019-04-11 DIAGNOSIS — I10 ESSENTIAL (PRIMARY) HYPERTENSION: ICD-10-CM

## 2019-04-11 DIAGNOSIS — Z96.7 PRESENCE OF OTHER BONE AND TENDON IMPLANTS: Chronic | ICD-10-CM

## 2019-04-11 DIAGNOSIS — Z98.89 OTHER SPECIFIED POSTPROCEDURAL STATES: Chronic | ICD-10-CM

## 2019-04-11 DIAGNOSIS — S62.109A FRACTURE OF UNSPECIFIED CARPAL BONE, UNSPECIFIED WRIST, INITIAL ENCOUNTER FOR CLOSED FRACTURE: Chronic | ICD-10-CM

## 2019-04-11 LAB
ANION GAP SERPL CALC-SCNC: 14 MMOL/L — SIGNIFICANT CHANGE UP (ref 5–17)
APPEARANCE UR: CLEAR — SIGNIFICANT CHANGE UP
APTT BLD: 29.3 SEC — SIGNIFICANT CHANGE UP (ref 27.5–36.3)
BASOPHILS # BLD AUTO: 0 K/UL — SIGNIFICANT CHANGE UP (ref 0–0.2)
BASOPHILS NFR BLD AUTO: 0.2 % — SIGNIFICANT CHANGE UP (ref 0–2)
BILIRUB UR-MCNC: NEGATIVE — SIGNIFICANT CHANGE UP
BLD GP AB SCN SERPL QL: NEGATIVE — SIGNIFICANT CHANGE UP
BUN SERPL-MCNC: 8 MG/DL — SIGNIFICANT CHANGE UP (ref 7–23)
CALCIUM SERPL-MCNC: 8.9 MG/DL — SIGNIFICANT CHANGE UP (ref 8.4–10.5)
CHLORIDE SERPL-SCNC: 100 MMOL/L — SIGNIFICANT CHANGE UP (ref 96–108)
CO2 SERPL-SCNC: 26 MMOL/L — SIGNIFICANT CHANGE UP (ref 22–31)
COLOR SPEC: SIGNIFICANT CHANGE UP
CREAT SERPL-MCNC: 0.57 MG/DL — SIGNIFICANT CHANGE UP (ref 0.5–1.3)
DIFF PNL FLD: NEGATIVE — SIGNIFICANT CHANGE UP
EOSINOPHIL # BLD AUTO: 0 K/UL — SIGNIFICANT CHANGE UP (ref 0–0.5)
EOSINOPHIL NFR BLD AUTO: 0.3 % — SIGNIFICANT CHANGE UP (ref 0–6)
GLUCOSE SERPL-MCNC: 163 MG/DL — HIGH (ref 70–99)
GLUCOSE UR QL: NEGATIVE — SIGNIFICANT CHANGE UP
HCT VFR BLD CALC: 40.8 % — SIGNIFICANT CHANGE UP (ref 34.5–45)
HGB BLD-MCNC: 13.7 G/DL — SIGNIFICANT CHANGE UP (ref 11.5–15.5)
INR BLD: 1.03 RATIO — SIGNIFICANT CHANGE UP (ref 0.88–1.16)
KETONES UR-MCNC: NEGATIVE — SIGNIFICANT CHANGE UP
LEUKOCYTE ESTERASE UR-ACNC: NEGATIVE — SIGNIFICANT CHANGE UP
LYMPHOCYTES # BLD AUTO: 1.3 K/UL — SIGNIFICANT CHANGE UP (ref 1–3.3)
LYMPHOCYTES # BLD AUTO: 8.6 % — LOW (ref 13–44)
MCHC RBC-ENTMCNC: 30.7 PG — SIGNIFICANT CHANGE UP (ref 27–34)
MCHC RBC-ENTMCNC: 33.5 GM/DL — SIGNIFICANT CHANGE UP (ref 32–36)
MCV RBC AUTO: 91.7 FL — SIGNIFICANT CHANGE UP (ref 80–100)
MONOCYTES # BLD AUTO: 0.8 K/UL — SIGNIFICANT CHANGE UP (ref 0–0.9)
MONOCYTES NFR BLD AUTO: 5.4 % — SIGNIFICANT CHANGE UP (ref 2–14)
NEUTROPHILS # BLD AUTO: 13 K/UL — HIGH (ref 1.8–7.4)
NEUTROPHILS NFR BLD AUTO: 85.5 % — HIGH (ref 43–77)
NITRITE UR-MCNC: NEGATIVE — SIGNIFICANT CHANGE UP
PH UR: 6.5 — SIGNIFICANT CHANGE UP (ref 5–8)
PLATELET # BLD AUTO: 274 K/UL — SIGNIFICANT CHANGE UP (ref 150–400)
POTASSIUM SERPL-MCNC: 4 MMOL/L — SIGNIFICANT CHANGE UP (ref 3.5–5.3)
POTASSIUM SERPL-SCNC: 4 MMOL/L — SIGNIFICANT CHANGE UP (ref 3.5–5.3)
PROT UR-MCNC: NEGATIVE — SIGNIFICANT CHANGE UP
PROTHROM AB SERPL-ACNC: 11.9 SEC — SIGNIFICANT CHANGE UP (ref 10–12.9)
RBC # BLD: 4.45 M/UL — SIGNIFICANT CHANGE UP (ref 3.8–5.2)
RBC # FLD: 12.5 % — SIGNIFICANT CHANGE UP (ref 10.3–14.5)
RH IG SCN BLD-IMP: POSITIVE — SIGNIFICANT CHANGE UP
SODIUM SERPL-SCNC: 140 MMOL/L — SIGNIFICANT CHANGE UP (ref 135–145)
SP GR SPEC: 1.02 — SIGNIFICANT CHANGE UP (ref 1.01–1.02)
UROBILINOGEN FLD QL: NEGATIVE — SIGNIFICANT CHANGE UP
WBC # BLD: 15.3 K/UL — HIGH (ref 3.8–10.5)
WBC # FLD AUTO: 15.3 K/UL — HIGH (ref 3.8–10.5)

## 2019-04-11 PROCEDURE — 27244 TREAT THIGH FRACTURE: CPT | Mod: LT

## 2019-04-11 PROCEDURE — 99285 EMERGENCY DEPT VISIT HI MDM: CPT | Mod: 25

## 2019-04-11 PROCEDURE — 73502 X-RAY EXAM HIP UNI 2-3 VIEWS: CPT

## 2019-04-11 PROCEDURE — 73700 CT LOWER EXTREMITY W/O DYE: CPT | Mod: 26,LT

## 2019-04-11 PROCEDURE — 36415 COLL VENOUS BLD VENIPUNCTURE: CPT

## 2019-04-11 PROCEDURE — 93010 ELECTROCARDIOGRAM REPORT: CPT

## 2019-04-11 PROCEDURE — 76377 3D RENDER W/INTRP POSTPROCES: CPT | Mod: 26

## 2019-04-11 PROCEDURE — 96374 THER/PROPH/DIAG INJ IV PUSH: CPT

## 2019-04-11 PROCEDURE — 85027 COMPLETE CBC AUTOMATED: CPT

## 2019-04-11 PROCEDURE — 73552 X-RAY EXAM OF FEMUR 2/>: CPT

## 2019-04-11 PROCEDURE — 96376 TX/PRO/DX INJ SAME DRUG ADON: CPT

## 2019-04-11 PROCEDURE — 80053 COMPREHEN METABOLIC PANEL: CPT

## 2019-04-11 PROCEDURE — 71045 X-RAY EXAM CHEST 1 VIEW: CPT | Mod: 26

## 2019-04-11 RX ORDER — HYDROMORPHONE HYDROCHLORIDE 2 MG/ML
0.4 INJECTION INTRAMUSCULAR; INTRAVENOUS; SUBCUTANEOUS
Qty: 0 | Refills: 0 | Status: DISCONTINUED | OUTPATIENT
Start: 2019-04-11 | End: 2019-04-11

## 2019-04-11 RX ORDER — HYDROCHLOROTHIAZIDE 25 MG
12.5 TABLET ORAL DAILY
Qty: 0 | Refills: 0 | Status: DISCONTINUED | OUTPATIENT
Start: 2019-04-11 | End: 2019-04-16

## 2019-04-11 RX ORDER — OXYCODONE HYDROCHLORIDE 5 MG/1
5 TABLET ORAL EVERY 4 HOURS
Qty: 0 | Refills: 0 | Status: DISCONTINUED | OUTPATIENT
Start: 2019-04-11 | End: 2019-04-11

## 2019-04-11 RX ORDER — POLYETHYLENE GLYCOL 3350 17 G/17G
17 POWDER, FOR SOLUTION ORAL DAILY
Qty: 0 | Refills: 0 | Status: DISCONTINUED | OUTPATIENT
Start: 2019-04-11 | End: 2019-04-16

## 2019-04-11 RX ORDER — ENOXAPARIN SODIUM 100 MG/ML
40 INJECTION SUBCUTANEOUS EVERY 24 HOURS
Qty: 0 | Refills: 0 | Status: DISCONTINUED | OUTPATIENT
Start: 2019-04-11 | End: 2019-04-16

## 2019-04-11 RX ORDER — ONDANSETRON 8 MG/1
4 TABLET, FILM COATED ORAL ONCE
Qty: 0 | Refills: 0 | Status: DISCONTINUED | OUTPATIENT
Start: 2019-04-11 | End: 2019-04-11

## 2019-04-11 RX ORDER — MORPHINE SULFATE 50 MG/1
2 CAPSULE, EXTENDED RELEASE ORAL EVERY 4 HOURS
Qty: 0 | Refills: 0 | Status: DISCONTINUED | OUTPATIENT
Start: 2019-04-11 | End: 2019-04-16

## 2019-04-11 RX ORDER — DOCUSATE SODIUM 100 MG
100 CAPSULE ORAL THREE TIMES A DAY
Qty: 0 | Refills: 0 | Status: DISCONTINUED | OUTPATIENT
Start: 2019-04-11 | End: 2019-04-16

## 2019-04-11 RX ORDER — OXYCODONE HYDROCHLORIDE 5 MG/1
10 TABLET ORAL EVERY 4 HOURS
Qty: 0 | Refills: 0 | Status: DISCONTINUED | OUTPATIENT
Start: 2019-04-11 | End: 2019-04-16

## 2019-04-11 RX ORDER — BISOPROLOL FUMARATE 10 MG/1
10 TABLET, FILM COATED ORAL DAILY
Qty: 0 | Refills: 0 | Status: DISCONTINUED | OUTPATIENT
Start: 2019-04-11 | End: 2019-04-16

## 2019-04-11 RX ORDER — CEFAZOLIN SODIUM 1 G
2000 VIAL (EA) INJECTION EVERY 8 HOURS
Qty: 0 | Refills: 0 | Status: COMPLETED | OUTPATIENT
Start: 2019-04-11 | End: 2019-04-12

## 2019-04-11 RX ORDER — MORPHINE SULFATE 50 MG/1
2 CAPSULE, EXTENDED RELEASE ORAL ONCE
Qty: 0 | Refills: 0 | Status: DISCONTINUED | OUTPATIENT
Start: 2019-04-11 | End: 2019-04-11

## 2019-04-11 RX ORDER — OXYCODONE HYDROCHLORIDE 5 MG/1
2.5 TABLET ORAL EVERY 4 HOURS
Qty: 0 | Refills: 0 | Status: DISCONTINUED | OUTPATIENT
Start: 2019-04-11 | End: 2019-04-11

## 2019-04-11 RX ORDER — LANOLIN ALCOHOL/MO/W.PET/CERES
3 CREAM (GRAM) TOPICAL AT BEDTIME
Qty: 0 | Refills: 0 | Status: DISCONTINUED | OUTPATIENT
Start: 2019-04-11 | End: 2019-04-16

## 2019-04-11 RX ORDER — MAGNESIUM HYDROXIDE 400 MG/1
30 TABLET, CHEWABLE ORAL DAILY
Qty: 0 | Refills: 0 | Status: DISCONTINUED | OUTPATIENT
Start: 2019-04-11 | End: 2019-04-16

## 2019-04-11 RX ORDER — SODIUM CHLORIDE 9 MG/ML
1000 INJECTION INTRAMUSCULAR; INTRAVENOUS; SUBCUTANEOUS
Qty: 0 | Refills: 0 | Status: DISCONTINUED | OUTPATIENT
Start: 2019-04-11 | End: 2019-04-16

## 2019-04-11 RX ORDER — SENNA PLUS 8.6 MG/1
2 TABLET ORAL AT BEDTIME
Qty: 0 | Refills: 0 | Status: DISCONTINUED | OUTPATIENT
Start: 2019-04-11 | End: 2019-04-16

## 2019-04-11 RX ORDER — TRAMADOL HYDROCHLORIDE 50 MG/1
25 TABLET ORAL EVERY 8 HOURS
Qty: 0 | Refills: 0 | Status: DISCONTINUED | OUTPATIENT
Start: 2019-04-11 | End: 2019-04-16

## 2019-04-11 RX ORDER — ACETAMINOPHEN 500 MG
975 TABLET ORAL EVERY 8 HOURS
Qty: 0 | Refills: 0 | Status: DISCONTINUED | OUTPATIENT
Start: 2019-04-11 | End: 2019-04-16

## 2019-04-11 RX ORDER — SODIUM CHLORIDE 9 MG/ML
1000 INJECTION, SOLUTION INTRAVENOUS
Qty: 0 | Refills: 0 | Status: DISCONTINUED | OUTPATIENT
Start: 2019-04-11 | End: 2019-04-16

## 2019-04-11 RX ORDER — ONDANSETRON 8 MG/1
4 TABLET, FILM COATED ORAL EVERY 6 HOURS
Qty: 0 | Refills: 0 | Status: DISCONTINUED | OUTPATIENT
Start: 2019-04-11 | End: 2019-04-16

## 2019-04-11 RX ORDER — OXYCODONE HYDROCHLORIDE 5 MG/1
5 TABLET ORAL EVERY 4 HOURS
Qty: 0 | Refills: 0 | Status: DISCONTINUED | OUTPATIENT
Start: 2019-04-11 | End: 2019-04-16

## 2019-04-11 RX ORDER — OXYCODONE HYDROCHLORIDE 5 MG/1
10 TABLET ORAL EVERY 4 HOURS
Qty: 0 | Refills: 0 | Status: DISCONTINUED | OUTPATIENT
Start: 2019-04-11 | End: 2019-04-11

## 2019-04-11 RX ADMIN — MORPHINE SULFATE 2 MILLIGRAM(S): 50 CAPSULE, EXTENDED RELEASE ORAL at 02:14

## 2019-04-11 RX ADMIN — Medication 975 MILLIGRAM(S): at 21:41

## 2019-04-11 RX ADMIN — MORPHINE SULFATE 2 MILLIGRAM(S): 50 CAPSULE, EXTENDED RELEASE ORAL at 10:12

## 2019-04-11 RX ADMIN — SENNA PLUS 2 TABLET(S): 8.6 TABLET ORAL at 21:41

## 2019-04-11 RX ADMIN — MORPHINE SULFATE 2 MILLIGRAM(S): 50 CAPSULE, EXTENDED RELEASE ORAL at 10:03

## 2019-04-11 RX ADMIN — Medication 100 MILLIGRAM(S): at 21:40

## 2019-04-11 RX ADMIN — OXYCODONE HYDROCHLORIDE 10 MILLIGRAM(S): 5 TABLET ORAL at 20:06

## 2019-04-11 RX ADMIN — OXYCODONE HYDROCHLORIDE 10 MILLIGRAM(S): 5 TABLET ORAL at 19:36

## 2019-04-11 RX ADMIN — HYDROMORPHONE HYDROCHLORIDE 0.4 MILLIGRAM(S): 2 INJECTION INTRAMUSCULAR; INTRAVENOUS; SUBCUTANEOUS at 16:10

## 2019-04-11 RX ADMIN — SODIUM CHLORIDE 75 MILLILITER(S): 9 INJECTION INTRAMUSCULAR; INTRAVENOUS; SUBCUTANEOUS at 16:16

## 2019-04-11 RX ADMIN — Medication 975 MILLIGRAM(S): at 22:11

## 2019-04-11 RX ADMIN — Medication 100 MILLIGRAM(S): at 21:41

## 2019-04-11 RX ADMIN — MORPHINE SULFATE 2 MILLIGRAM(S): 50 CAPSULE, EXTENDED RELEASE ORAL at 05:55

## 2019-04-11 RX ADMIN — BISOPROLOL FUMARATE 10 MILLIGRAM(S): 10 TABLET, FILM COATED ORAL at 05:56

## 2019-04-11 RX ADMIN — HYDROMORPHONE HYDROCHLORIDE 0.4 MILLIGRAM(S): 2 INJECTION INTRAMUSCULAR; INTRAVENOUS; SUBCUTANEOUS at 16:30

## 2019-04-11 NOTE — PROGRESS NOTE ADULT - SUBJECTIVE AND OBJECTIVE BOX
ORTHO ATTENDING POST OP    s/p ORIF  L femur periprosthetic fx  TTWB L LE  aquacel- pt may shower w aquacel in place  Lovenox 40 QD  Venodynes  CBC in RR and AM  OOB to chair in AM  Ancef 1 g x 24h  rehab consult  f/u medicine

## 2019-04-11 NOTE — PRE-ANESTHESIA EVALUATION ADULT - NSANTHPMHFT_GEN_ALL_CORE
68y Female community ambulator presents c/o L hip pain and inability to ambulate sp mechanical fall. Pt fell off bed onto floor 1day ago.  Denies HS/LOC. Denies numbness/tingling. Denies fever/chills. Denies pain/injury elsewhere.   Patient is s/p left hip replacement (12/4/18), L acetabulum ORIF 7/1/18, both of which were done by Dr Miller at [facility].    She has no other orthopedic complaints at this time

## 2019-04-11 NOTE — BRIEF OPERATIVE NOTE - NSICDXBRIEFPROCEDURE_GEN_ALL_CORE_FT
PROCEDURES:  Open reduction and internal fixation (ORIF) of fracture of left femur using plate and screws 11-Apr-2019 13:46:10  Rubi Stephenson

## 2019-04-11 NOTE — ED PROVIDER NOTE - ATTENDING CONTRIBUTION TO CARE
67 y/o female with the above documented history and HPI who on exam appears well and comfortable. Ortho aware of her arrival. Anticipate admission to their service with nothing clinically evident to suggest any alternative or additional emergent process.

## 2019-04-11 NOTE — H&P ADULT - NSHPLABSRESULTS_GEN_ALL_CORE
12.4   11.0  )-----------( 288      ( 10 Apr 2019 22:27 )             38.0     10 Apr 2019 22:27    138    |  100    |  19.0   ----------------------------<  132    3.9     |  27.0   |  0.80     Ca    9.2        10 Apr 2019 22:27    TPro  6.5    /  Alb  3.6    /  TBili  0.5    /  DBili  x      /  AST  26     /  ALT  18     /  AlkPhos  129    10 Apr 2019 22:27    PT/INR - ( 11 Apr 2019 04:32 )   PT: 11.9 sec;   INR: 1.03 ratio         PTT - ( 11 Apr 2019 04:32 )  PTT:29.3 sec  Vital Signs Last 24 Hrs  T(C): 36.6 (04-11-19 @ 03:14), Max: 36.8 (04-10-19 @ 19:33)  T(F): 97.9 (04-11-19 @ 03:14), Max: 98.3 (04-10-19 @ 19:33)  HR: 70 (04-11-19 @ 03:14) (62 - 70)  BP: 157/62 (04-11-19 @ 03:14) (118/64 - 157/62)  BP(mean): --  RR: 18 (04-11-19 @ 03:14) (16 - 20)  SpO2: 99% (04-11-19 @ 03:14) (98% - 100%)    Imaging: XR demonstates L hip periprosthetic fracture

## 2019-04-11 NOTE — PROGRESS NOTE ADULT - SUBJECTIVE AND OBJECTIVE BOX
69yo F hx htn, L hip replacement by dr. catalan in december presents  s/p mechanical fall from her bed falling on her L hip. Pt was sent to [place] found to have a periprosthetic fracture of L femur. Neurovascularly intact. Transferred here for Dr. catalan care. Patient scheduled for Open reduction and internal fixation of a left periprosthetic hip fx. Patient states she has been feeling well. Denies any chest pain or shortness of breath with exertion.         PAST MEDICAL & SURGICAL HISTORY:  Mild intermittent asthma, unspecified whether complicated: denies ED admission or ED admission- denies any use of brochodilators x 3 years  Pneumonia:   OA (osteoarthritis) of hip: Left hip  Fracture acetabulum-closed: 2018  Hypertension  S/P ORIF (open reduction internal fixation) fracture: Left acetabulum 2108  Fx wrist  Status post wrist surgery        MEDICATIONS  (STANDING):  bisoprolol   Tablet 10 milliGRAM(s) Oral daily  hydrochlorothiazide 12.5 milliGRAM(s) Oral daily  lactated ringers. 1000 milliLiter(s) (75 mL/Hr) IV Continuous <Continuous>    MEDICATIONS  (PRN):  morphine  - Injectable 2 milliGRAM(s) IV Push every 4 hours PRN Severe Pain (7 - 10)  oxyCODONE    IR 10 milliGRAM(s) Oral every 4 hours PRN Moderate Pain (4 - 6)  oxyCODONE    IR 5 milliGRAM(s) Oral every 4 hours PRN Mild Pain (1 - 3)    Social Hx:  Tobacco: Neg  ETOH: Neg  Drugs: Neg    Family Hx  CAD    CONSTITUTIONAL: No weakness, fevers or chills  EYES/ENT: No visual changes;  No vertigo or throat pain   NECK: No pain or stiffness  RESPIRATORY: No cough, wheezing, hemoptysis; No shortness of breath  CARDIOVASCULAR: No chest pain or palpitations  GASTROINTESTINAL: No abdominal or epigastric pain. No nausea, vomiting, or hematemesis; No diarrhea or constipation. No melena or hematochezia.  GENITOURINARY: No dysuria, frequency or hematuria  NEUROLOGICAL: No numbness or weakness  SKIN: No itching, burning, rashes, or lesions   MUSCULOSKELETAL: left leg pain    INTERVAL HPI/OVERNIGHT EVENTS:  T(C): 36.7 (19 @ 09:31), Max: 36.9 (19 @ 06:20)  HR: 65 (19 @ 09:31) (62 - 88)  BP: 137/68 (19 @ 09:31) (118/58 - 157/62)  RR: 16 (19 @ 09:31) (16 - 20)  SpO2: 94% (19 @ 09:31) (94% - 100%)  Wt(kg): --  I&O's Summary      PHYSICAL EXAM:  GENERAL: NAD, well-groomed, well-developed  HEAD:  Atraumatic, Normocephalic  EYES: EOMI, PERRLA, conjunctiva and sclera clear  ENMT: No tonsillar erythema, exudates, or enlargement; Moist mucous membranes, Good dentition, No lesions  NECK: Supple, No JVD, Normal thyroid  NERVOUS SYSTEM:  Alert & Oriented X3, Good concentration; Motor Strength 5/5 B/L upper and lower extremities; DTRs 2+ intact and symmetric  CHEST/LUNG: Clear to percussion bilaterally; No rales, rhonchi, wheezing, or rubs  HEART: Regular rate and rhythm; No murmurs, rubs, or gallops  ABDOMEN: Soft, Nontender, Nondistended; Bowel sounds present  EXTREMITIES: shortening and external rotation of left leg  LYMPH: No lymphadenopathy noted  SKIN: No rashes or lesions        LABS:                        12.4   11.0  )-----------( 288      ( 10 Apr 2019 22:27 )             38.0     04-10    138  |  100  |  19.0  ----------------------------<  132<H>  3.9   |  27.0  |  0.80    Ca    9.2      10 Apr 2019 22:27    TPro  6.5<L>  /  Alb  3.6  /  TBili  0.5  /  DBili  x   /  AST  26  /  ALT  18  /  AlkPhos  129<H>  0410    PT/INR - ( 2019 04:32 )   PT: 11.9 sec;   INR: 1.03 ratio         PTT - ( 2019 04:32 )  PTT:29.3 sec  Urinalysis Basic - ( 2019 08:14 )    Color: Light Yellow / Appearance: Clear / S.019 / pH: x  Gluc: x / Ketone: Negative  / Bili: Negative / Urobili: Negative   Blood: x / Protein: Negative / Nitrite: Negative   Leuk Esterase: Negative / RBC: x / WBC x   Sq Epi: x / Non Sq Epi: x / Bacteria: x      EKG  NSR @ 65            Urinalysis Basic - ( 2019 08:14 )    Color: Light Yellow / Appearance: Clear / S.019 / pH: x  Gluc: x / Ketone: Negative  / Bili: Negative / Urobili: Negative   Blood: x / Protein: Negative / Nitrite: Negative   Leuk Esterase: Negative / RBC: x / WBC x   Sq Epi: x / Non Sq Epi: x / Bacteria: x        Radiology reports:

## 2019-04-11 NOTE — ED PROVIDER NOTE - NS ED ROS FT
CONSTITUTIONAL: No fevers, no chills, no lightheadedness, no dizziness  Eyes: no visual changes  Ears: no ear drainage, no ear pain  Nose: no nasal congestion  Mouth/Throat: no sore throat  CV: No chest pain, no palpitations  PULM: No SOB, no cough  GI: No n/v/d, no abd pain  : no dysuria, no hematuria  SKIN: no rashes.  NEURO: no headache, no focal weakness or numbness  PSYCHIATRIC: no known mental health issues.

## 2019-04-11 NOTE — ED ADULT NURSE NOTE - OBJECTIVE STATEMENT
69 yo female presents to ED as transfer from [facility].  PMH of HTN and previous hip replacement.  pt states that she had a shattered fracture of her pelvis and gotten a hip replacement in 12/2018.  pt states that she was sitting on bed and slipped, and fell on her hip.  pt is AOx4, PERRL, lungs clear and equal bilat, abdomen soft non tender.  Positive pulses in affected e 67 yo female presents to ED as transfer from [facility].  PMH of HTN and previous hip replacement.  pt states that she had a shattered fracture of her pelvis and gotten a hip replacement in 12/2018.  pt states that she was sitting on bed and slipped, and fell on her hip.  pt is AOx4, PERRL, lungs clear and equal bilat, abdomen soft non tender.  Positive pulses in affected extremity, left leg is shortened and externally rotated.  pt denies CP, SOB, HA, Blurry vision, Dizziness. pt resting comfortably in bed educated to call for assistance or any worsening symptoms

## 2019-04-11 NOTE — ED PROVIDER NOTE - CARE PLAN
Principal Discharge DX:	Femur fracture Principal Discharge DX:	Other closed fracture of left femur, unspecified portion of femur, initial encounter

## 2019-04-11 NOTE — ED PROVIDER NOTE - OBJECTIVE STATEMENT
69yo F hx htn, L hip replacement by dr. catalan here s/p mechanical fall from her bed falling on her L hip. Pt was sent to [place] found to have a periprosthetic fracture of L femur. Neurovascularly intact. Transferred here for Dr. catalan care.

## 2019-04-11 NOTE — H&P ADULT - NSICDXPASTSURGICALHX_GEN_ALL_CORE_FT
PAST SURGICAL HISTORY:  Fx wrist     S/P ORIF (open reduction internal fixation) fracture Left acetabulum 07/01/2108    Status post wrist surgery

## 2019-04-11 NOTE — H&P ADULT - NSICDXPASTMEDICALHX_GEN_ALL_CORE_FT
PAST MEDICAL HISTORY:  Fracture acetabulum-closed 06/30/2018    Hypertension     Mild intermittent asthma, unspecified whether complicated denies ED admission or ED admission- denies any use of brochodilators x 3 years    OA (osteoarthritis) of hip Left hip    Pneumonia 2015

## 2019-04-11 NOTE — H&P ADULT - NSHPPHYSICALEXAM_GEN_ALL_CORE
Gen: NAD  lungs nonlabored breathing  cv no peripheral edema  abd nt/nd  L LE: skin intact, short/ER leg, unable to SLR L LE, + log roll L LE, +ttp hip/groin, no ttp elsewhere, +ehl/fhl/ta/gs function, no calf ttp, dp/pt pulse intact, compartments soft  Secondary survey: benign, nv intact, able to SLR contralateral leg, negative log roll contralateral leg, no bony ttp elsewhere

## 2019-04-11 NOTE — ED PROVIDER NOTE - PHYSICAL EXAMINATION
Gen: Well appearing, NAD  Head: NCAT  HEENT: PERRL, MMM, normal conjunctiva, anicteric, neck supple  Lung: CTAB, no adventitious sounds  CV: RRR, no murmurs  Abd: soft, NTND, no rebound or guarding, no CVAT  MSK: No edema, no visible deformities, ttp along L femur  Neuro: 5/5 strength and normal sensation distal to injury  Skin: Warm and dry, no evidence of rash  Psych: normal mood and affect

## 2019-04-11 NOTE — H&P ADULT - ASSESSMENT
A/P: 68y Female with L hip periprosthetic fracture    Pain control  NWB L LE, bedrest  FU labs/imaging  Ca/Vit D  Outpt osteoporosis workup  Admit to [place]  Medical clearance/optimization for OR needed  For OR today  NPO except meds  IVF while NPO  hold chemical AC  follow up CT LLE  will d/w attending and advise if plan changes

## 2019-04-11 NOTE — CHART NOTE - NSCHARTNOTEFT_GEN_A_CORE
Patient seen in RR. Resting without complaints.  Denies Chest Pain, SOB, N/V.    T(C): 36.2 (04-11-19 @ 17:30), Max: 36.9 (04-11-19 @ 06:20)  HR: 56 (04-11-19 @ 17:30) (55 - 88)  BP: 137/64 (04-11-19 @ 17:30) (118/58 - 157/62)  RR: 16 (04-11-19 @ 17:30) (14 - 20)  SpO2: 100% (04-11-19 @ 17:30) (94% - 100%)  Wt(kg): --    Exam:  Alert and [place], No Acute Distress  Card: +S1/S2, RRR  Pulm: CTAB  Laterality: L hip aquacels c/d/i  Calves soft, non-tender bilaterally  +PF/DF/EHL/FHL  SILT  + DP pulse    Xray: Intra-op imaging in chart                          13.7   15.3  )-----------( 274      ( 11 Apr 2019 16:53 )             40.8    04-11    140  |  100  |  8   ----------------------------<  163<H>  4.0   |  26  |  0.57    Ca    8.9      11 Apr 2019 16:44    TPro  6.5<L>  /  Alb  3.6  /  TBili  0.5  /  DBili  x   /  AST  26  /  ALT  18  /  AlkPhos  129<H>  04-10      A/P: Patient is a 68y Female S/p ORIF of L Hip Periprosthetic femur fracture. VSS. NAD  -PT/OT-TTWB to LLE  -IS encouraged  -DVT PPx- Lovenox 40 mg SQ   -Pain Control PRN  -OOB to chair in AM  -FU AM Labs      Sugar Brizuela PA-C  Team Pager #[phone]

## 2019-04-11 NOTE — H&P ADULT - HISTORY OF PRESENT ILLNESS
68y Female community ambulator presents c/o L hip pain and inability to ambulate sp mechanical fall. Pt fell off bed onto floor 1day ago.  Denies HS/LOC. Denies numbness/tingling. Denies fever/chills. Denies pain/injury elsewhere.   Patient is s/p left hip replacement (12/4/18), L acetabulum ORIF 7/1/18, both of which were done by Dr Miller at [facility].    She has no other orthopedic complaints at this time

## 2019-04-11 NOTE — ED ADULT NURSE REASSESSMENT NOTE - NS ED NURSE REASSESS COMMENT FT1
pt a&ox3, states LLE pain 6/10 dr rene aware, will order pain meds. pt to be transferred to [facility] ED, report given to gerry LONDON at 0100. pending p/u. paperwork signed. updated on plan of care, verbalize understanding. call bell in reach

## 2019-04-12 DIAGNOSIS — D50.0 IRON DEFICIENCY ANEMIA SECONDARY TO BLOOD LOSS (CHRONIC): ICD-10-CM

## 2019-04-12 LAB
ANION GAP SERPL CALC-SCNC: 10 MMOL/L — SIGNIFICANT CHANGE UP (ref 5–17)
BUN SERPL-MCNC: 12 MG/DL — SIGNIFICANT CHANGE UP (ref 7–23)
CALCIUM SERPL-MCNC: 8.8 MG/DL — SIGNIFICANT CHANGE UP (ref 8.4–10.5)
CHLORIDE SERPL-SCNC: 103 MMOL/L — SIGNIFICANT CHANGE UP (ref 96–108)
CO2 SERPL-SCNC: 26 MMOL/L — SIGNIFICANT CHANGE UP (ref 22–31)
CREAT SERPL-MCNC: 0.71 MG/DL — SIGNIFICANT CHANGE UP (ref 0.5–1.3)
GLUCOSE SERPL-MCNC: 142 MG/DL — HIGH (ref 70–99)
HCT VFR BLD CALC: 33.8 % — LOW (ref 34.5–45)
HGB BLD-MCNC: 10.8 G/DL — LOW (ref 11.5–15.5)
MCHC RBC-ENTMCNC: 29.4 PG — SIGNIFICANT CHANGE UP (ref 27–34)
MCHC RBC-ENTMCNC: 32 GM/DL — SIGNIFICANT CHANGE UP (ref 32–36)
MCV RBC AUTO: 92.1 FL — SIGNIFICANT CHANGE UP (ref 80–100)
PLATELET # BLD AUTO: 348 K/UL — SIGNIFICANT CHANGE UP (ref 150–400)
POTASSIUM SERPL-MCNC: 4.5 MMOL/L — SIGNIFICANT CHANGE UP (ref 3.5–5.3)
POTASSIUM SERPL-SCNC: 4.5 MMOL/L — SIGNIFICANT CHANGE UP (ref 3.5–5.3)
RBC # BLD: 3.67 M/UL — LOW (ref 3.8–5.2)
RBC # FLD: 12.9 % — SIGNIFICANT CHANGE UP (ref 10.3–14.5)
SODIUM SERPL-SCNC: 139 MMOL/L — SIGNIFICANT CHANGE UP (ref 135–145)
WBC # BLD: 10.27 K/UL — SIGNIFICANT CHANGE UP (ref 3.8–10.5)
WBC # FLD AUTO: 10.27 K/UL — SIGNIFICANT CHANGE UP (ref 3.8–10.5)

## 2019-04-12 RX ADMIN — SENNA PLUS 2 TABLET(S): 8.6 TABLET ORAL at 21:27

## 2019-04-12 RX ADMIN — Medication 975 MILLIGRAM(S): at 21:27

## 2019-04-12 RX ADMIN — Medication 975 MILLIGRAM(S): at 13:47

## 2019-04-12 RX ADMIN — Medication 100 MILLIGRAM(S): at 13:17

## 2019-04-12 RX ADMIN — Medication 975 MILLIGRAM(S): at 06:15

## 2019-04-12 RX ADMIN — Medication 975 MILLIGRAM(S): at 06:45

## 2019-04-12 RX ADMIN — Medication 100 MILLIGRAM(S): at 06:16

## 2019-04-12 RX ADMIN — OXYCODONE HYDROCHLORIDE 10 MILLIGRAM(S): 5 TABLET ORAL at 21:26

## 2019-04-12 RX ADMIN — Medication 975 MILLIGRAM(S): at 22:00

## 2019-04-12 RX ADMIN — POLYETHYLENE GLYCOL 3350 17 GRAM(S): 17 POWDER, FOR SOLUTION ORAL at 12:02

## 2019-04-12 RX ADMIN — ENOXAPARIN SODIUM 40 MILLIGRAM(S): 100 INJECTION SUBCUTANEOUS at 12:02

## 2019-04-12 RX ADMIN — Medication 975 MILLIGRAM(S): at 13:17

## 2019-04-12 RX ADMIN — Medication 100 MILLIGRAM(S): at 21:27

## 2019-04-12 RX ADMIN — OXYCODONE HYDROCHLORIDE 10 MILLIGRAM(S): 5 TABLET ORAL at 22:00

## 2019-04-12 RX ADMIN — Medication 100 MILLIGRAM(S): at 06:14

## 2019-04-12 NOTE — PHYSICAL THERAPY INITIAL EVALUATION ADULT - PERTINENT HX OF CURRENT PROBLEM, REHAB EVAL
67yo female hx of L pelvic fx Jul. 2018, L GAVIOTA by Dr. Miller Dec. 2018, s/presents with pain in L hip s/p mechanical fall while sitting on bed putting socks on sent to [place] found to have periprosthetic fx L femur, transferred here for Dr. Miller care. Pt POD1 s/p L femur ORIF, TTWB LLE.

## 2019-04-12 NOTE — PROGRESS NOTE ADULT - ASSESSMENT
67 yo woman with a hx of a THR in December presents s/p fall at home. Patient found to have a left periprosthetic fx. Had ORIF with plate and screws wtih 200 cc blood loss at OR. Hgn down from 13.7 to 10.8.  Need to follow H/H downtrend to see if she will need PRBC transfusion.

## 2019-04-12 NOTE — PROGRESS NOTE ADULT - SUBJECTIVE AND OBJECTIVE BOX
69yo F hx htn, L hip replacement by dr. catalan in december presents  s/p mechanical fall from her bed falling on her L hip. Pt was sent to [place] found to have a periprosthetic fracture of L femur. Neurovascularly intact. Transferred here for Dr. catalan care. Patient had Open reduction and internal fixation of a left periprosthetic hip fx on 19.   Patient states she has been feeling well. Denies any chest pain or shortness of breath with exertion. Encouraged deep breathing exercises and incentive spirometry.        PAST MEDICAL & SURGICAL HISTORY:  Mild intermittent asthma, unspecified whether complicated: denies ED admission or ED admission- denies any use of brochodilators x 3 years  Pneumonia:   OA (osteoarthritis) of hip: Left hip  Fracture acetabulum-closed: 2018  Hypertension  S/P ORIF (open reduction internal fixation) fracture: Left acetabulum 2108  Fx wrist  Status post wrist surgery        MEDICATIONS  (STANDING):  bisoprolol   Tablet 10 milliGRAM(s) Oral daily  hydrochlorothiazide 12.5 milliGRAM(s) Oral daily  lactated ringers. 1000 milliLiter(s) (75 mL/Hr) IV Continuous <Continuous>    MEDICATIONS  (PRN):  morphine  - Injectable 2 milliGRAM(s) IV Push every 4 hours PRN Severe Pain (7 - 10)  oxyCODONE    IR 10 milliGRAM(s) Oral every 4 hours PRN Moderate Pain (4 - 6)  oxyCODONE    IR 5 milliGRAM(s) Oral every 4 hours PRN Mild Pain (1 - 3)    Social Hx:  Tobacco: Neg  ETOH: Neg  Drugs: Neg    Family Hx  CAD      Vital Signs Last 24 Hrs  T(C): 36.9 (2019 16:37), Max: 36.9 (2019 16:37)  T(F): 98.4 (2019 16:37), Max: 98.4 (2019 16:37)  HR: 70 (2019 16:37) (60 - 76)  BP: 101/65 (2019 16:37) (96/69 - 118/73)  BP(mean): --  RR: 16 (2019 16:37) (16 - 18)  SpO2: 98% (2019 16:37) (94% - 100%)      PHYSICAL EXAM:  GENERAL: NAD, well-groomed, well-developed  HEAD:  Atraumatic, Normocephalic  EYES: EOMI, PERRLA, conjunctiva and sclera clear  ENMT: No tonsillar erythema, exudates, or enlargement; Moist mucous membranes, Good dentition, No lesions  NECK: Supple, No JVD, Normal thyroid  NERVOUS SYSTEM:  Alert & Oriented X3, Good concentration; Motor Strength 5/5 B/L upper and lower extremities; DTRs 2+ intact and symmetric  CHEST/LUNG: Clear to percussion bilaterally; No rales, rhonchi, wheezing, or rubs  HEART: Regular rate and rhythm; No murmurs, rubs, or gallops  ABDOMEN: Soft, Nontender, Nondistended; Bowel sounds present  EXTREMITIES:  ORIF left periprosthetic fracture using plate and screws  LYMPH: No lymphadenopathy noted  SKIN: No rashes or lesions        LABS:                   CBC Full  -  ( 2019 09:04 )  WBC Count : 10.27 K/uL  RBC Count : 3.67 M/uL  Hemoglobin : 10.8 g/dL  Hematocrit : 33.8 %  Platelet Count - Automated : 348 K/uL  Mean Cell Volume : 92.1 fl  Mean Cell Hemoglobin : 29.4 pg  Mean Cell Hemoglobin Concentration : 32.0 gm/dL  Auto Neutrophil # : x  Auto Lymphocyte # : x  Auto Monocyte # : x  Auto Eosinophil # : x  Auto Basophil # : x  Auto Neutrophil % : x  Auto Lymphocyte % : x  Auto Monocyte % : x  Auto Eosinophil % : x  Auto Basophil % : x    04-12    139  |  103  |  12  ----------------------------<  142<H>  4.5   |  26  |  0.71    Ca    8.8      2019 05:57    TPro  6.5<L>  /  Alb  3.6  /  TBili  0.5  /  DBili  x   /  AST  26  /  ALT  18  /  AlkPhos  129<H>  04-10    LIVER FUNCTIONS - ( 10 Apr 2019 22:27 )  Alb: 3.6 g/dL / Pro: 6.5 g/dL / ALK PHOS: 129 U/L / ALT: 18 U/L / AST: 26 U/L / GGT: x           PT/INR - ( 2019 04:32 )   PT: 11.9 sec;   INR: 1.03 ratio         PTT - ( 2019 04:32 )  PTT:29.3 sec  Urinalysis Basic - ( 2019 08:14 )    Color: Light Yellow / Appearance: Clear / S.019 / pH: x  Gluc: x / Ketone: Negative  / Bili: Negative / Urobili: Negative   Blood: x / Protein: Negative / Nitrite: Negative   Leuk Esterase: Negative / RBC: x / WBC x   Sq Epi: x / Non Sq Epi: x / Bacteria: x            Radiology reports:

## 2019-04-12 NOTE — PHYSICAL THERAPY INITIAL EVALUATION ADULT - ADDITIONAL COMMENTS
Pt reports she lives in private home with 4-5 steps to enter with B HR far apart from each other. Pt reports she lives with other family members however she is home alone most of the day and does not have much help. Pt prior to injury was receiving outpatient physical therapy s/p L GAVIOTA Dec. 2018.

## 2019-04-12 NOTE — PHYSICAL THERAPY INITIAL EVALUATION ADULT - ACTIVE RANGE OF MOTION EXAMINATION, REHAB EVAL
LE limited secondary to pain/Right LE Active ROM was WFL (within functional limits)/bilateral upper extremity Active ROM was WFL (within functional limits)

## 2019-04-12 NOTE — PROGRESS NOTE ADULT - SUBJECTIVE AND OBJECTIVE BOX
Pt seen and examined. Resting without complaints. No acute events overnight.  Denies Chest Pain, SOB, N/V.    Vital Signs Last 24 Hrs  T(C): 36.5 (12 Apr 2019 04:37), Max: 36.9 (11 Apr 2019 06:20)  T(F): 97.7 (12 Apr 2019 04:37), Max: 98.5 (11 Apr 2019 06:20)  HR: 65 (12 Apr 2019 04:37) (55 - 70)  BP: 112/58 (12 Apr 2019 04:37) (102/61 - 146/57)  BP(mean): 92 (11 Apr 2019 17:30) (84 - 92)  RR: 16 (12 Apr 2019 04:37) (14 - 18)  SpO2: 95% (12 Apr 2019 04:37) (94% - 100%)    Exam:  Alert and [place], No Acute Distress  Card: +S1/S2, RRR  Pulm: CTAB  Laterality: L hip aquacels c/d/i  Calves soft, non-tender bilaterally  +PF/DF/EHL/FHL  SILT  + DP pulse

## 2019-04-12 NOTE — PROGRESS NOTE ADULT - ASSESSMENT
A/P: Patient is a 68y Female S/p ORIF of L Hip Periprosthetic femur fracture.POD1:  -PT/OT-TTWB to LLE  -IS encouraged  -DVT PPx- Lovenox 40 mg SQ   -Pain Control PRN  -OOB to chair in AM  -FU AM Labs

## 2019-04-12 NOTE — PHYSICAL THERAPY INITIAL EVALUATION ADULT - MANUAL MUSCLE TESTING RESULTS, REHAB EVAL
grossly assessed due to/Strength is grossly at least 3+/5 throughout. LLE not tested secondary to TTWBS

## 2019-04-13 DIAGNOSIS — S72.8X2A OTHER FRACTURE OF LEFT FEMUR, INITIAL ENCOUNTER FOR CLOSED FRACTURE: ICD-10-CM

## 2019-04-13 LAB
ANION GAP SERPL CALC-SCNC: 9 MMOL/L — SIGNIFICANT CHANGE UP (ref 5–17)
BUN SERPL-MCNC: 12 MG/DL — SIGNIFICANT CHANGE UP (ref 7–23)
CALCIUM SERPL-MCNC: 8.7 MG/DL — SIGNIFICANT CHANGE UP (ref 8.4–10.5)
CHLORIDE SERPL-SCNC: 103 MMOL/L — SIGNIFICANT CHANGE UP (ref 96–108)
CO2 SERPL-SCNC: 27 MMOL/L — SIGNIFICANT CHANGE UP (ref 22–31)
CREAT SERPL-MCNC: 0.58 MG/DL — SIGNIFICANT CHANGE UP (ref 0.5–1.3)
GLUCOSE SERPL-MCNC: 94 MG/DL — SIGNIFICANT CHANGE UP (ref 70–99)
HCT VFR BLD CALC: 29 % — LOW (ref 34.5–45)
HGB BLD-MCNC: 9.2 G/DL — LOW (ref 11.5–15.5)
MCHC RBC-ENTMCNC: 29.5 PG — SIGNIFICANT CHANGE UP (ref 27–34)
MCHC RBC-ENTMCNC: 31.7 GM/DL — LOW (ref 32–36)
MCV RBC AUTO: 92.9 FL — SIGNIFICANT CHANGE UP (ref 80–100)
PLATELET # BLD AUTO: 273 K/UL — SIGNIFICANT CHANGE UP (ref 150–400)
POTASSIUM SERPL-MCNC: 3.8 MMOL/L — SIGNIFICANT CHANGE UP (ref 3.5–5.3)
POTASSIUM SERPL-SCNC: 3.8 MMOL/L — SIGNIFICANT CHANGE UP (ref 3.5–5.3)
RBC # BLD: 3.12 M/UL — LOW (ref 3.8–5.2)
RBC # FLD: 13.2 % — SIGNIFICANT CHANGE UP (ref 10.3–14.5)
SODIUM SERPL-SCNC: 139 MMOL/L — SIGNIFICANT CHANGE UP (ref 135–145)
WBC # BLD: 9.32 K/UL — SIGNIFICANT CHANGE UP (ref 3.8–10.5)
WBC # FLD AUTO: 9.32 K/UL — SIGNIFICANT CHANGE UP (ref 3.8–10.5)

## 2019-04-13 RX ORDER — SODIUM CHLORIDE 9 MG/ML
250 INJECTION INTRAMUSCULAR; INTRAVENOUS; SUBCUTANEOUS ONCE
Qty: 0 | Refills: 0 | Status: COMPLETED | OUTPATIENT
Start: 2019-04-13 | End: 2019-04-13

## 2019-04-13 RX ADMIN — Medication 975 MILLIGRAM(S): at 13:47

## 2019-04-13 RX ADMIN — SODIUM CHLORIDE 500 MILLILITER(S): 9 INJECTION INTRAMUSCULAR; INTRAVENOUS; SUBCUTANEOUS at 16:07

## 2019-04-13 RX ADMIN — Medication 975 MILLIGRAM(S): at 06:50

## 2019-04-13 RX ADMIN — Medication 975 MILLIGRAM(S): at 14:17

## 2019-04-13 RX ADMIN — Medication 100 MILLIGRAM(S): at 13:47

## 2019-04-13 RX ADMIN — OXYCODONE HYDROCHLORIDE 5 MILLIGRAM(S): 5 TABLET ORAL at 21:40

## 2019-04-13 RX ADMIN — OXYCODONE HYDROCHLORIDE 10 MILLIGRAM(S): 5 TABLET ORAL at 10:20

## 2019-04-13 RX ADMIN — BISOPROLOL FUMARATE 10 MILLIGRAM(S): 10 TABLET, FILM COATED ORAL at 11:31

## 2019-04-13 RX ADMIN — Medication 975 MILLIGRAM(S): at 06:17

## 2019-04-13 RX ADMIN — SENNA PLUS 2 TABLET(S): 8.6 TABLET ORAL at 21:11

## 2019-04-13 RX ADMIN — OXYCODONE HYDROCHLORIDE 5 MILLIGRAM(S): 5 TABLET ORAL at 21:11

## 2019-04-13 RX ADMIN — Medication 975 MILLIGRAM(S): at 21:40

## 2019-04-13 RX ADMIN — Medication 100 MILLIGRAM(S): at 06:17

## 2019-04-13 RX ADMIN — Medication 100 MILLIGRAM(S): at 21:11

## 2019-04-13 RX ADMIN — OXYCODONE HYDROCHLORIDE 10 MILLIGRAM(S): 5 TABLET ORAL at 10:50

## 2019-04-13 RX ADMIN — Medication 975 MILLIGRAM(S): at 21:11

## 2019-04-13 RX ADMIN — ENOXAPARIN SODIUM 40 MILLIGRAM(S): 100 INJECTION SUBCUTANEOUS at 11:29

## 2019-04-13 RX ADMIN — OXYCODONE HYDROCHLORIDE 10 MILLIGRAM(S): 5 TABLET ORAL at 02:51

## 2019-04-13 RX ADMIN — OXYCODONE HYDROCHLORIDE 10 MILLIGRAM(S): 5 TABLET ORAL at 03:20

## 2019-04-13 NOTE — PROGRESS NOTE ADULT - SUBJECTIVE AND OBJECTIVE BOX
69yo F hx htn, L hip replacement by dr. catalan in december presents  s/p mechanical fall from her bed falling on her L hip. Pt was sent to [place] found to have a periprosthetic fracture of L femur. Neurovascularly intact. Transferred here for Dr. catalan care. Patient had Open reduction and internal fixation of a left periprosthetic hip fx on 4/11/19.   Patient states she has been feeling well. Denies any chest pain or shortness of breath with exertion. Encouraged deep breathing exercises and incentive spirometry.        PAST MEDICAL & SURGICAL HISTORY:  Mild intermittent asthma, unspecified whether complicated: denies ED admission or ED admission- denies any use of brochodilators x 3 years  Pneumonia: 2015  OA (osteoarthritis) of hip: Left hip  Fracture acetabulum-closed: 06/30/2018  Hypertension  S/P ORIF (open reduction internal fixation) fracture: Left acetabulum 07/01/2108  Fx wrist  Status post wrist surgery        MEDICATIONS  (STANDING):  acetaminophen   Tablet .. 975 milliGRAM(s) Oral every 8 hours  bisoprolol   Tablet 10 milliGRAM(s) Oral daily  docusate sodium 100 milliGRAM(s) Oral three times a day  enoxaparin Injectable 40 milliGRAM(s) SubCutaneous every 24 hours  hydrochlorothiazide 12.5 milliGRAM(s) Oral daily  hydrochlorothiazide 12.5 milliGRAM(s) Oral daily  lactated ringers. 1000 milliLiter(s) (75 mL/Hr) IV Continuous <Continuous>  polyethylene glycol 3350 17 Gram(s) Oral daily  senna 2 Tablet(s) Oral at bedtime  sodium chloride 0.9%. 1000 milliLiter(s) (75 mL/Hr) IV Continuous <Continuous>    MEDICATIONS  (PRN):  bisacodyl Suppository 10 milliGRAM(s) Rectal daily PRN If no bowel movement  magnesium hydroxide Suspension 30 milliLiter(s) Oral daily PRN Constipation  melatonin 3 milliGRAM(s) Oral at bedtime PRN Insomnia  morphine  - Injectable 2 milliGRAM(s) IV Push every 4 hours PRN Severe Pain (7 - 10)  morphine  - Injectable 2 milliGRAM(s) IV Push every 4 hours PRN Breakthrough pain  ondansetron Injectable 4 milliGRAM(s) IV Push every 6 hours PRN Nausea and/or Vomiting  oxyCODONE    IR 5 milliGRAM(s) Oral every 4 hours PRN Moderate Pain (4 - 6)  oxyCODONE    IR 10 milliGRAM(s) Oral every 4 hours PRN Severe Pain (7 - 10)  traMADol 25 milliGRAM(s) Oral every 8 hours PRN Mild Pain (1 - 3)    Vital Signs Last 24 Hrs  T(C): 36.7 (13 Apr 2019 08:10), Max: 36.9 (12 Apr 2019 16:37)  T(F): 98 (13 Apr 2019 08:10), Max: 98.4 (12 Apr 2019 16:37)  HR: 76 (13 Apr 2019 10:43) (68 - 82)  BP: 102/62 (13 Apr 2019 10:43) (97/59 - 111/55)  BP(mean): --  RR: 18 (13 Apr 2019 08:10) (16 - 18)  SpO2: 100% (13 Apr 2019 10:43) (96% - 100%)        PHYSICAL EXAM:  GENERAL: NAD, well-groomed, well-developed  HEAD:  Atraumatic, Normocephalic  EYES: EOMI, PERRLA, conjunctiva and sclera clear  ENMT: No tonsillar erythema, exudates, or enlargement; Moist mucous membranes, Good dentition, No lesions  NECK: Supple, No JVD, Normal thyroid  NERVOUS SYSTEM:  Alert & Oriented X3, Good concentration; Motor Strength 5/5 B/L upper and lower extremities; DTRs 2+ intact and symmetric  CHEST/LUNG: Clear to percussion bilaterally; No rales, rhonchi, wheezing, or rubs  HEART: Regular rate and rhythm; No murmurs, rubs, or gallops  ABDOMEN: Soft, Nontender, Nondistended; Bowel sounds present  EXTREMITIES:  ORIF left periprosthetic fracture using plate and screws  LYMPH: No lymphadenopathy noted  SKIN: No rashes or lesions        LABS:                      CBC Full  -  ( 13 Apr 2019 09:33 )  WBC Count : 9.32 K/uL  RBC Count : 3.12 M/uL  Hemoglobin : 9.2 g/dL  Hematocrit : 29.0 %  Platelet Count - Automated : 273 K/uL  Mean Cell Volume : 92.9 fl  Mean Cell Hemoglobin : 29.5 pg  Mean Cell Hemoglobin Concentration : 31.7 gm/dL  Auto Neutrophil # : x  Auto Lymphocyte # : x  Auto Monocyte # : x  Auto Eosinophil # : x  Auto Basophil # : x  Auto Neutrophil % : x  Auto Lymphocyte % : x  Auto Monocyte % : x  Auto Eosinophil % : x  Auto Basophil % : x    04-13    139  |  103  |  12  ----------------------------<  94  3.8   |  27  |  0.58    Ca    8.7      13 Apr 2019 06:12 67yo F hx htn, L hip replacement by dr. catalan in december presents  s/p mechanical fall from her bed falling on her L hip. Pt was sent to [place] found to have a periprosthetic fracture of L femur. Neurovascularly intact. Transferred here for Dr. catalan care. Patient had Open reduction and internal fixation of a left periprosthetic hip fx on 4/11/19.   Patient states she has been feeling well. Denies any chest pain or shortness of breath with exertion. Encouraged deep breathing exercises and incentive spirometry. Out of bed to chair awaiting physical therapy and weight bearing determination by orthopedics.        PAST MEDICAL & SURGICAL HISTORY:  Mild intermittent asthma, unspecified whether complicated: denies ED admission or ED admission- denies any use of brochodilators x 3 years  Pneumonia: 2015  OA (osteoarthritis) of hip: Left hip  Fracture acetabulum-closed: 06/30/2018  Hypertension  S/P ORIF (open reduction internal fixation) fracture: Left acetabulum 07/01/2108  Fx wrist  Status post wrist surgery        MEDICATIONS  (STANDING):  acetaminophen   Tablet .. 975 milliGRAM(s) Oral every 8 hours  bisoprolol   Tablet 10 milliGRAM(s) Oral daily  docusate sodium 100 milliGRAM(s) Oral three times a day  enoxaparin Injectable 40 milliGRAM(s) SubCutaneous every 24 hours  hydrochlorothiazide 12.5 milliGRAM(s) Oral daily  hydrochlorothiazide 12.5 milliGRAM(s) Oral daily  lactated ringers. 1000 milliLiter(s) (75 mL/Hr) IV Continuous <Continuous>  polyethylene glycol 3350 17 Gram(s) Oral daily  senna 2 Tablet(s) Oral at bedtime  sodium chloride 0.9%. 1000 milliLiter(s) (75 mL/Hr) IV Continuous <Continuous>    MEDICATIONS  (PRN):  bisacodyl Suppository 10 milliGRAM(s) Rectal daily PRN If no bowel movement  magnesium hydroxide Suspension 30 milliLiter(s) Oral daily PRN Constipation  melatonin 3 milliGRAM(s) Oral at bedtime PRN Insomnia  morphine  - Injectable 2 milliGRAM(s) IV Push every 4 hours PRN Severe Pain (7 - 10)  morphine  - Injectable 2 milliGRAM(s) IV Push every 4 hours PRN Breakthrough pain  ondansetron Injectable 4 milliGRAM(s) IV Push every 6 hours PRN Nausea and/or Vomiting  oxyCODONE    IR 5 milliGRAM(s) Oral every 4 hours PRN Moderate Pain (4 - 6)  oxyCODONE    IR 10 milliGRAM(s) Oral every 4 hours PRN Severe Pain (7 - 10)  traMADol 25 milliGRAM(s) Oral every 8 hours PRN Mild Pain (1 - 3)    Vital Signs Last 24 Hrs  T(C): 36.7 (13 Apr 2019 08:10), Max: 36.9 (12 Apr 2019 16:37)  T(F): 98 (13 Apr 2019 08:10), Max: 98.4 (12 Apr 2019 16:37)  HR: 76 (13 Apr 2019 10:43) (68 - 82)  BP: 102/62 (13 Apr 2019 10:43) (97/59 - 111/55)  BP(mean): --  RR: 18 (13 Apr 2019 08:10) (16 - 18)  SpO2: 100% (13 Apr 2019 10:43) (96% - 100%)        PHYSICAL EXAM:  GENERAL: NAD, well-groomed, well-developed  HEAD:  Atraumatic, Normocephalic  EYES: EOMI, PERRLA, conjunctiva and sclera clear  ENMT: No tonsillar erythema, exudates, or enlargement; Moist mucous membranes, Good dentition, No lesions  NECK: Supple, No JVD, Normal thyroid  NERVOUS SYSTEM:  Alert & Oriented X3, Good concentration; Motor Strength 5/5 B/L upper and lower extremities; DTRs 2+ intact and symmetric  CHEST/LUNG: Clear to percussion bilaterally; No rales, rhonchi, wheezing, or rubs  HEART: Regular rate and rhythm; No murmurs, rubs, or gallops  ABDOMEN: Soft, Nontender, Nondistended; Bowel sounds present  EXTREMITIES:  ORIF left periprosthetic fracture using plate and screws  LYMPH: No lymphadenopathy noted  SKIN: No rashes or lesions        LABS:                      CBC Full  -  ( 13 Apr 2019 09:33 )  WBC Count : 9.32 K/uL  RBC Count : 3.12 M/uL  Hemoglobin : 9.2 g/dL  Hematocrit : 29.0 %  Platelet Count - Automated : 273 K/uL  Mean Cell Volume : 92.9 fl  Mean Cell Hemoglobin : 29.5 pg  Mean Cell Hemoglobin Concentration : 31.7 gm/dL  Auto Neutrophil # : x  Auto Lymphocyte # : x  Auto Monocyte # : x  Auto Eosinophil # : x  Auto Basophil # : x  Auto Neutrophil % : x  Auto Lymphocyte % : x  Auto Monocyte % : x  Auto Eosinophil % : x  Auto Basophil % : x    04-13    139  |  103  |  12  ----------------------------<  94  3.8   |  27  |  0.58    Ca    8.7      13 Apr 2019 06:12

## 2019-04-13 NOTE — PROGRESS NOTE ADULT - ASSESSMENT
67 yo woman with a hx of a THR in December presents s/p fall at home. Patient found to have a left periprosthetic fx. Had ORIF with plate and screws wtih 200 cc blood loss at OR. Hgn down from 13.7 to 10.8.  Need to follow H/H downtrend to see if she will need PRBC transfusion. 67 yo woman with a hx of a THR in December presents s/p fall at home. Patient found to have a left periprosthetic fx. Had ORIF with plate and screws wtih 200 cc blood loss at OR. Hgn down from 13.7 to 10.8.  Need to follow H/H downtrend to see if she will need PRBC transfusion. Hemoglobin 9.2 today.

## 2019-04-13 NOTE — PROGRESS NOTE ADULT - ASSESSMENT
Assessment: s/p Open Reduction Internal Fixation / Surgical Repair Ashly-prosthetic Fx    Plan:  PT: TTWB  Lovenox QD  ck labs

## 2019-04-13 NOTE — PROGRESS NOTE ADULT - SUBJECTIVE AND OBJECTIVE BOX
Patient is a 68y old  Female who presents with a chief complaint of L hip periprosthetic fracture (12 Apr 2019 11:27)      POST OPERATIVE DAY #:  [1]  Patient comfortable  No complaints    VS:  T(C): 36.6 (04-13-19 @ 04:33), Max: 36.9 (04-12-19 @ 16:37)  T(F): 97.8 (04-13-19 @ 04:33), Max: 98.4 (04-12-19 @ 16:37)  HR: 68 (04-13-19 @ 04:33) (68 - 82)  BP: 106/57 (04-13-19 @ 04:33) (96/69 - 115/65)  RR: 18 (04-13-19 @ 04:33) (16 - 18)  SpO2: 97% (04-13-19 @ 04:33) (96% - 100%)        PHYSICAL EXAM:  NAD, Alert  EXT:   LLE  x[ ] Aquacel Dressing C/D/I  No Calf Tenderness  (+) DF/PF; (+) EHL/FHL  Sensation: No gross deficits noted  Pulses [ 2+ ]   B/L, PAS     LABS:                        10.8<L>  10.27 )-----------( 348      ( 12 Apr 2019 09:04 )             33.8<L>    04-13    139  |  103  |  12  ----------------------------<  94  3.8   |  27  |  0.58          RADIOLOGY & ADDITIONAL TESTS:

## 2019-04-14 LAB
ANION GAP SERPL CALC-SCNC: 9 MMOL/L — SIGNIFICANT CHANGE UP (ref 5–17)
BUN SERPL-MCNC: 11 MG/DL — SIGNIFICANT CHANGE UP (ref 7–23)
CALCIUM SERPL-MCNC: 9 MG/DL — SIGNIFICANT CHANGE UP (ref 8.4–10.5)
CHLORIDE SERPL-SCNC: 106 MMOL/L — SIGNIFICANT CHANGE UP (ref 96–108)
CO2 SERPL-SCNC: 28 MMOL/L — SIGNIFICANT CHANGE UP (ref 22–31)
CREAT SERPL-MCNC: 0.59 MG/DL — SIGNIFICANT CHANGE UP (ref 0.5–1.3)
GLUCOSE SERPL-MCNC: 105 MG/DL — HIGH (ref 70–99)
HCT VFR BLD CALC: 26.9 % — LOW (ref 34.5–45)
HGB BLD-MCNC: 8.4 G/DL — LOW (ref 11.5–15.5)
MCHC RBC-ENTMCNC: 28.9 PG — SIGNIFICANT CHANGE UP (ref 27–34)
MCHC RBC-ENTMCNC: 31.2 GM/DL — LOW (ref 32–36)
MCV RBC AUTO: 92.4 FL — SIGNIFICANT CHANGE UP (ref 80–100)
PLATELET # BLD AUTO: 302 K/UL — SIGNIFICANT CHANGE UP (ref 150–400)
POTASSIUM SERPL-MCNC: 4 MMOL/L — SIGNIFICANT CHANGE UP (ref 3.5–5.3)
POTASSIUM SERPL-SCNC: 4 MMOL/L — SIGNIFICANT CHANGE UP (ref 3.5–5.3)
RBC # BLD: 2.91 M/UL — LOW (ref 3.8–5.2)
RBC # FLD: 13.2 % — SIGNIFICANT CHANGE UP (ref 10.3–14.5)
SODIUM SERPL-SCNC: 143 MMOL/L — SIGNIFICANT CHANGE UP (ref 135–145)
WBC # BLD: 9.39 K/UL — SIGNIFICANT CHANGE UP (ref 3.8–10.5)
WBC # FLD AUTO: 9.39 K/UL — SIGNIFICANT CHANGE UP (ref 3.8–10.5)

## 2019-04-14 RX ADMIN — Medication 975 MILLIGRAM(S): at 07:00

## 2019-04-14 RX ADMIN — Medication 100 MILLIGRAM(S): at 13:17

## 2019-04-14 RX ADMIN — Medication 975 MILLIGRAM(S): at 21:11

## 2019-04-14 RX ADMIN — Medication 975 MILLIGRAM(S): at 21:40

## 2019-04-14 RX ADMIN — Medication 975 MILLIGRAM(S): at 13:46

## 2019-04-14 RX ADMIN — Medication 975 MILLIGRAM(S): at 06:24

## 2019-04-14 RX ADMIN — BISOPROLOL FUMARATE 10 MILLIGRAM(S): 10 TABLET, FILM COATED ORAL at 07:23

## 2019-04-14 RX ADMIN — Medication 100 MILLIGRAM(S): at 21:11

## 2019-04-14 RX ADMIN — SENNA PLUS 2 TABLET(S): 8.6 TABLET ORAL at 21:11

## 2019-04-14 RX ADMIN — Medication 100 MILLIGRAM(S): at 06:24

## 2019-04-14 RX ADMIN — ENOXAPARIN SODIUM 40 MILLIGRAM(S): 100 INJECTION SUBCUTANEOUS at 11:43

## 2019-04-14 RX ADMIN — Medication 975 MILLIGRAM(S): at 13:16

## 2019-04-14 NOTE — PROGRESS NOTE ADULT - SUBJECTIVE AND OBJECTIVE BOX
69yo F hx htn, L hip replacement by dr. catalan in december presents  s/p mechanical fall from her bed falling on her L hip. Pt was sent to [place] found to have a periprosthetic fracture of L femur. Neurovascularly intact. Transferred here for Dr. catalan care. Patient had Open reduction and internal fixation of a left periprosthetic hip fx on 4/11/19.   Patient states she has been feeling well. Denies any chest pain or shortness of breath with exertion. Encouraged deep breathing exercises and incentive spirometry. Out of bed to chair awaiting physical therapy and weight bearing determination by orthopedics.        PAST MEDICAL & SURGICAL HISTORY:  Mild intermittent asthma, unspecified whether complicated: denies ED admission or ED admission- denies any use of brochodilators x 3 years  Pneumonia: 2015  OA (osteoarthritis) of hip: Left hip  Fracture acetabulum-closed: 06/30/2018  Hypertension  S/P ORIF (open reduction internal fixation) fracture: Left acetabulum 07/01/2108  Fx wrist  Status post wrist surgery    MEDICATIONS  (STANDING):  acetaminophen   Tablet .. 975 milliGRAM(s) Oral every 8 hours  bisoprolol   Tablet 10 milliGRAM(s) Oral daily  docusate sodium 100 milliGRAM(s) Oral three times a day  enoxaparin Injectable 40 milliGRAM(s) SubCutaneous every 24 hours  hydrochlorothiazide 12.5 milliGRAM(s) Oral daily  hydrochlorothiazide 12.5 milliGRAM(s) Oral daily  lactated ringers. 1000 milliLiter(s) (75 mL/Hr) IV Continuous <Continuous>  polyethylene glycol 3350 17 Gram(s) Oral daily  senna 2 Tablet(s) Oral at bedtime  sodium chloride 0.9%. 1000 milliLiter(s) (75 mL/Hr) IV Continuous <Continuous>    MEDICATIONS  (PRN):  bisacodyl Suppository 10 milliGRAM(s) Rectal daily PRN If no bowel movement  magnesium hydroxide Suspension 30 milliLiter(s) Oral daily PRN Constipation  melatonin 3 milliGRAM(s) Oral at bedtime PRN Insomnia  morphine  - Injectable 2 milliGRAM(s) IV Push every 4 hours PRN Severe Pain (7 - 10)  morphine  - Injectable 2 milliGRAM(s) IV Push every 4 hours PRN Breakthrough pain  ondansetron Injectable 4 milliGRAM(s) IV Push every 6 hours PRN Nausea and/or Vomiting  oxyCODONE    IR 5 milliGRAM(s) Oral every 4 hours PRN Moderate Pain (4 - 6)  oxyCODONE    IR 10 milliGRAM(s) Oral every 4 hours PRN Severe Pain (7 - 10)  traMADol 25 milliGRAM(s) Oral every 8 hours PRN Mild Pain (1 - 3)    Vital Signs Last 24 Hrs  T(C): 36.7 (14 Apr 2019 13:10), Max: 37.1 (13 Apr 2019 20:05)  T(F): 98 (14 Apr 2019 13:10), Max: 98.8 (13 Apr 2019 20:05)  HR: 66 (14 Apr 2019 13:10) (63 - 97)  BP: 116/59 (14 Apr 2019 13:10) (90/44 - 122/64)  BP(mean): --  RR: 16 (14 Apr 2019 13:10) (16 - 18)  SpO2: 94% (14 Apr 2019 13:10) (94% - 96%)          PHYSICAL EXAM:  GENERAL: NAD, well-groomed, well-developed  HEAD:  Atraumatic, Normocephalic  EYES: EOMI, PERRLA, conjunctiva and sclera clear  ENMT: No tonsillar erythema, exudates, or enlargement; Moist mucous membranes, Good dentition, No lesions  NECK: Supple, No JVD, Normal thyroid  NERVOUS SYSTEM:  Alert & Oriented X3, Good concentration; Motor Strength 5/5 B/L upper and lower extremities; DTRs 2+ intact and symmetric  CHEST/LUNG: Clear to percussion bilaterally; No rales, rhonchi, wheezing, or rubs  HEART: Regular rate and rhythm; No murmurs, rubs, or gallops  ABDOMEN: Soft, Nontender, Nondistended; Bowel sounds present  EXTREMITIES:  ORIF left periprosthetic fracture using plate and screws  LYMPH: No lymphadenopathy noted  SKIN: No rashes or lesions        LABS:                      CBC Full  -  ( 13 Apr 2019 09:33 )  WBC Count : 9.32 K/uL  RBC Count : 3.12 M/uL  Hemoglobin : 9.2 g/dL  Hematocrit : 29.0 %  Platelet Count - Automated : 273 K/uL  Mean Cell Volume : 92.9 fl  Mean Cell Hemoglobin : 29.5 pg  Mean Cell Hemoglobin Concentration : 31.7 gm/dL  Auto Neutrophil # : x  Auto Lymphocyte # : x  Auto Monocyte # : x  Auto Eosinophil # : x  Auto Basophil # : x  Auto Neutrophil % : x  Auto Lymphocyte % : x  Auto Monocyte % : x  Auto Eosinophil % : x  Auto Basophil % : x    04-13    139  |  103  |  12  ----------------------------<  94  3.8   |  27  |  0.58    Ca    8.7      13 Apr 2019 06:12 69yo F hx htn, L hip replacement by Dr. Miller in December, 2018 presents  s/p mechanical fall from her bed falling on her L hip. Pt was sent to [facility] and found to have a periprosthetic fracture of L femur. Neurovascularly intact. Transferred here for Dr. Miller care. Patient had Open reduction and internal fixation of a left periprosthetic hip fx on 4/11/19.   Patient states she has been feeling well. Denies any chest pain or shortness of breath with exertion. Encouraged deep breathing exercises and incentive spirometry. Out of bed to chair awaiting physical therapy and weight bearing determination by orthopedics. There have been no interval changes over the past 24 hours and the patient remains hemodynamically stable.        PAST MEDICAL & SURGICAL HISTORY:  Mild intermittent asthma, unspecified whether complicated: denies ED admission or ED admission- denies any use of brochodilators x 3 years  Pneumonia: 2015  OA (osteoarthritis) of hip: Left hip  Fracture acetabulum-closed: 06/30/2018  Hypertension  S/P ORIF (open reduction internal fixation) fracture: Left acetabulum 07/01/2108  Fx wrist  Status post wrist surgery    MEDICATIONS  (STANDING):  acetaminophen   Tablet .. 975 milliGRAM(s) Oral every 8 hours  bisoprolol   Tablet 10 milliGRAM(s) Oral daily  docusate sodium 100 milliGRAM(s) Oral three times a day  enoxaparin Injectable 40 milliGRAM(s) SubCutaneous every 24 hours  hydrochlorothiazide 12.5 milliGRAM(s) Oral daily  hydrochlorothiazide 12.5 milliGRAM(s) Oral daily  lactated ringers. 1000 milliLiter(s) (75 mL/Hr) IV Continuous <Continuous>  polyethylene glycol 3350 17 Gram(s) Oral daily  senna 2 Tablet(s) Oral at bedtime  sodium chloride 0.9%. 1000 milliLiter(s) (75 mL/Hr) IV Continuous <Continuous>    MEDICATIONS  (PRN):  bisacodyl Suppository 10 milliGRAM(s) Rectal daily PRN If no bowel movement  magnesium hydroxide Suspension 30 milliLiter(s) Oral daily PRN Constipation  melatonin 3 milliGRAM(s) Oral at bedtime PRN Insomnia  morphine  - Injectable 2 milliGRAM(s) IV Push every 4 hours PRN Severe Pain (7 - 10)  morphine  - Injectable 2 milliGRAM(s) IV Push every 4 hours PRN Breakthrough pain  ondansetron Injectable 4 milliGRAM(s) IV Push every 6 hours PRN Nausea and/or Vomiting  oxyCODONE    IR 5 milliGRAM(s) Oral every 4 hours PRN Moderate Pain (4 - 6)  oxyCODONE    IR 10 milliGRAM(s) Oral every 4 hours PRN Severe Pain (7 - 10)  traMADol 25 milliGRAM(s) Oral every 8 hours PRN Mild Pain (1 - 3)    Vital Signs Last 24 Hrs  T(C): 36.7 (14 Apr 2019 13:10), Max: 37.1 (13 Apr 2019 20:05)  T(F): 98 (14 Apr 2019 13:10), Max: 98.8 (13 Apr 2019 20:05)  HR: 66 (14 Apr 2019 13:10) (63 - 97)  BP: 116/59 (14 Apr 2019 13:10) (90/44 - 122/64)  BP(mean): --  RR: 16 (14 Apr 2019 13:10) (16 - 18)  SpO2: 94% (14 Apr 2019 13:10) (94% - 96%)          PHYSICAL EXAM:  GENERAL: NAD, well-groomed, well-developed  HEAD:  Atraumatic, Normocephalic  EYES: EOMI, PERRLA, conjunctiva and sclera clear  ENMT: No tonsillar erythema, exudates, or enlargement; Moist mucous membranes, Good dentition, No lesions  NECK: Supple, No JVD, Normal thyroid  NERVOUS SYSTEM:  Alert & Oriented X3, Good concentration; Motor Strength 5/5 B/L upper and lower extremities; DTRs 2+ intact and symmetric  CHEST/LUNG: Clear to percussion bilaterally; No rales, rhonchi, wheezing, or rubs  HEART: Regular rate and rhythm; No murmurs, rubs, or gallops  ABDOMEN: Soft, Nontender, Nondistended; Bowel sounds present  EXTREMITIES:  ORIF left periprosthetic fracture using plate and screws  LYMPH: No lymphadenopathy noted  SKIN: No rashes or lesions        LABS:                      CBC Full  -  ( 13 Apr 2019 09:33 )  WBC Count : 9.32 K/uL  RBC Count : 3.12 M/uL  Hemoglobin : 9.2 g/dL  Hematocrit : 29.0 %  Platelet Count - Automated : 273 K/uL  Mean Cell Volume : 92.9 fl  Mean Cell Hemoglobin : 29.5 pg  Mean Cell Hemoglobin Concentration : 31.7 gm/dL  Auto Neutrophil # : x  Auto Lymphocyte # : x  Auto Monocyte # : x  Auto Eosinophil # : x  Auto Basophil # : x  Auto Neutrophil % : x  Auto Lymphocyte % : x  Auto Monocyte % : x  Auto Eosinophil % : x  Auto Basophil % : x    04-13    139  |  103  |  12  ----------------------------<  94  3.8   |  27  |  0.58    Ca    8.7      13 Apr 2019 06:12

## 2019-04-14 NOTE — PROGRESS NOTE ADULT - ASSESSMENT
A/P: 69 y/o F POD#3 s/p L Vanc ppx fx ORIF      DVT ppx- Lovenox  LLE TTWB  Pain management prn  GI ppx  Discharge planning to rehab      FLAKITO Gotti  Orthopedic Surgery  [phone]

## 2019-04-14 NOTE — PROGRESS NOTE ADULT - ASSESSMENT
67 yo woman with a hx of a THR in December presents s/p fall at home. Patient found to have a left periprosthetic fx. Had ORIF with plate and screws wtih 200 cc blood loss at OR. Hgn down from 13.7 to 10.8.  Need to follow H/H downtrend to see if she will need PRBC transfusion. Hemoglobin 9.2 today. 67 yo woman with a hx of a THR in December presents s/p fall at home. Patient found to have a left periprosthetic fx. Had ORIF with plate and screws wtih 200 cc blood loss at OR. Hgn down from 13.7 to 10.8.  Need to follow H/H downtrend to see if she will need PRBC transfusion. Hemoglobin 9.2 last checked 04/13/19.

## 2019-04-14 NOTE — PROGRESS NOTE ADULT - SUBJECTIVE AND OBJECTIVE BOX
Patient comfortable  No complaints    T(C): 36.8 (04-14-19 @ 04:04), Max: 37.1 (04-13-19 @ 20:05)  HR: 75 (04-14-19 @ 06:22) (68 - 97)  BP: 122/64 (04-14-19 @ 06:22) (90/44 - 122/64)  RR: 18 (04-14-19 @ 04:04) (18 - 18)  SpO2: 94% (04-14-19 @ 04:04) (94% - 100%)      PHYSICAL EXAM:  NAD, Alert  Left Hip: Aquacell Dressing C/D/I; sensation grossly intact to light touch; (+) DF/PF; (+) Distal Pulses; No Calf tenderness B/L, PAS     LABS:                        9.2    9.32  )-----------( 273      ( 13 Apr 2019 09:33 )             29.0     04-14    143  |  106  |  11  ----------------------------<  105<H>  4.0   |  28  |  0.59    Ca    9.0      14 Apr 2019 06:41

## 2019-04-15 ENCOUNTER — TRANSCRIPTION ENCOUNTER (OUTPATIENT)
Age: 69
End: 2019-04-15

## 2019-04-15 LAB
HCT VFR BLD CALC: 27.1 % — LOW (ref 34.5–45)
HGB BLD-MCNC: 8.4 G/DL — LOW (ref 11.5–15.5)
MCHC RBC-ENTMCNC: 29.4 PG — SIGNIFICANT CHANGE UP (ref 27–34)
MCHC RBC-ENTMCNC: 31 GM/DL — LOW (ref 32–36)
MCV RBC AUTO: 94.8 FL — SIGNIFICANT CHANGE UP (ref 80–100)
PLATELET # BLD AUTO: 324 K/UL — SIGNIFICANT CHANGE UP (ref 150–400)
RBC # BLD: 2.86 M/UL — LOW (ref 3.8–5.2)
RBC # FLD: 13.2 % — SIGNIFICANT CHANGE UP (ref 10.3–14.5)
WBC # BLD: 9.61 K/UL — SIGNIFICANT CHANGE UP (ref 3.8–10.5)
WBC # FLD AUTO: 9.61 K/UL — SIGNIFICANT CHANGE UP (ref 3.8–10.5)

## 2019-04-15 RX ORDER — OXYCODONE HYDROCHLORIDE 5 MG/1
1 TABLET ORAL
Qty: 0 | Refills: 0 | COMMUNITY
Start: 2019-04-15

## 2019-04-15 RX ORDER — ENOXAPARIN SODIUM 100 MG/ML
40 INJECTION SUBCUTANEOUS
Qty: 0 | Refills: 0 | COMMUNITY
Start: 2019-04-15

## 2019-04-15 RX ORDER — SENNA PLUS 8.6 MG/1
2 TABLET ORAL
Qty: 0 | Refills: 0 | COMMUNITY
Start: 2019-04-15

## 2019-04-15 RX ORDER — TRAMADOL HYDROCHLORIDE 50 MG/1
0.5 TABLET ORAL
Qty: 0 | Refills: 0 | COMMUNITY
Start: 2019-04-15

## 2019-04-15 RX ORDER — POLYETHYLENE GLYCOL 3350 17 G/17G
17 POWDER, FOR SOLUTION ORAL
Qty: 0 | Refills: 0 | COMMUNITY
Start: 2019-04-15

## 2019-04-15 RX ORDER — DOCUSATE SODIUM 100 MG
1 CAPSULE ORAL
Qty: 0 | Refills: 0 | COMMUNITY
Start: 2019-04-15

## 2019-04-15 RX ADMIN — Medication 12.5 MILLIGRAM(S): at 05:44

## 2019-04-15 RX ADMIN — Medication 975 MILLIGRAM(S): at 13:24

## 2019-04-15 RX ADMIN — Medication 975 MILLIGRAM(S): at 05:43

## 2019-04-15 RX ADMIN — Medication 975 MILLIGRAM(S): at 21:19

## 2019-04-15 RX ADMIN — Medication 975 MILLIGRAM(S): at 22:00

## 2019-04-15 RX ADMIN — Medication 3 MILLIGRAM(S): at 21:18

## 2019-04-15 RX ADMIN — Medication 975 MILLIGRAM(S): at 06:15

## 2019-04-15 RX ADMIN — Medication 100 MILLIGRAM(S): at 05:44

## 2019-04-15 RX ADMIN — ENOXAPARIN SODIUM 40 MILLIGRAM(S): 100 INJECTION SUBCUTANEOUS at 13:24

## 2019-04-15 RX ADMIN — BISOPROLOL FUMARATE 10 MILLIGRAM(S): 10 TABLET, FILM COATED ORAL at 05:44

## 2019-04-15 RX ADMIN — Medication 975 MILLIGRAM(S): at 13:54

## 2019-04-15 NOTE — PROGRESS NOTE ADULT - SUBJECTIVE AND OBJECTIVE BOX
69yo F hx htn, L hip replacement by Dr. Miller in December, 2018 presents  s/p mechanical fall from her bed falling on her L hip. Pt was sent to [facility] and found to have a periprosthetic fracture of L femur. Neurovascularly intact. Transferred here for Dr. Miller care. Patient had Open reduction and internal fixation of a left periprosthetic hip fx on 4/11/19.   Patient states she has been feeling well. Denies any chest pain or shortness of breath with exertion. Encouraged deep breathing exercises and incentive spirometry. Out of bed to chair awaiting physical therapy and weight bearing determination by orthopedics. There have been no interval changes over the past 24 hours and the patient remains hemodynamically stable.      MEDICATIONS  (STANDING):  acetaminophen   Tablet .. 975 milliGRAM(s) Oral every 8 hours  bisoprolol   Tablet 10 milliGRAM(s) Oral daily  docusate sodium 100 milliGRAM(s) Oral three times a day  enoxaparin Injectable 40 milliGRAM(s) SubCutaneous every 24 hours  hydrochlorothiazide 12.5 milliGRAM(s) Oral daily  hydrochlorothiazide 12.5 milliGRAM(s) Oral daily  lactated ringers. 1000 milliLiter(s) (75 mL/Hr) IV Continuous <Continuous>  polyethylene glycol 3350 17 Gram(s) Oral daily  senna 2 Tablet(s) Oral at bedtime  sodium chloride 0.9%. 1000 milliLiter(s) (75 mL/Hr) IV Continuous <Continuous>    MEDICATIONS  (PRN):  bisacodyl Suppository 10 milliGRAM(s) Rectal daily PRN If no bowel movement  magnesium hydroxide Suspension 30 milliLiter(s) Oral daily PRN Constipation  melatonin 3 milliGRAM(s) Oral at bedtime PRN Insomnia  morphine  - Injectable 2 milliGRAM(s) IV Push every 4 hours PRN Severe Pain (7 - 10)  morphine  - Injectable 2 milliGRAM(s) IV Push every 4 hours PRN Breakthrough pain  ondansetron Injectable 4 milliGRAM(s) IV Push every 6 hours PRN Nausea and/or Vomiting  oxyCODONE    IR 5 milliGRAM(s) Oral every 4 hours PRN Moderate Pain (4 - 6)  oxyCODONE    IR 10 milliGRAM(s) Oral every 4 hours PRN Severe Pain (7 - 10)  traMADol 25 milliGRAM(s) Oral every 8 hours PRN Mild Pain (1 - 3)          VITALS:   T(C): 36.5 (04-16-19 @ 00:02), Max: 36.9 (04-15-19 @ 04:07)  HR: 62 (04-16-19 @ 00:02) (58 - 68)  BP: 108/53 (04-16-19 @ 00:02) (108/53 - 128/66)  RR: 16 (04-16-19 @ 00:02) (16 - 18)  SpO2: 95% (04-16-19 @ 00:02) (94% - 97%)  Wt(kg): --      PHYSICAL EXAM:  GENERAL: NAD, well-groomed, well-developed  HEAD:  Atraumatic, Normocephalic  EYES: EOMI, PERRLA, conjunctiva and sclera clear  ENMT: No tonsillar erythema, exudates, or enlargement; Moist mucous membranes, Good dentition, No lesions  NECK: Supple, No JVD, Normal thyroid  NERVOUS SYSTEM:  Alert & Oriented X3, Good concentration; Motor Strength 5/5 B/L upper and lower extremities; DTRs 2+ intact and symmetric  CHEST/LUNG: Clear to percussion bilaterally; No rales, rhonchi, wheezing, or rubs  HEART: Regular rate and rhythm; No murmurs, rubs, or gallops  ABDOMEN: Soft, Nontender, Nondistended; Bowel sounds present  EXTREMITIES:  ORIF left periprosthetic fracture using plate and screws  LYMPH: No lymphadenopathy noted  SKIN: No rashes or lesions  LABS:        CBC Full  -  ( 15 Apr 2019 09:06 )  WBC Count : 9.61 K/uL  RBC Count : 2.86 M/uL  Hemoglobin : 8.4 g/dL  Hematocrit : 27.1 %  Platelet Count - Automated : 324 K/uL  Mean Cell Volume : 94.8 fl  Mean Cell Hemoglobin : 29.4 pg  Mean Cell Hemoglobin Concentration : 31.0 gm/dL  Auto Neutrophil # : x  Auto Lymphocyte # : x  Auto Monocyte # : x  Auto Eosinophil # : x  Auto Basophil # : x  Auto Neutrophil % : x  Auto Lymphocyte % : x  Auto Monocyte % : x  Auto Eosinophil % : x  Auto Basophil % : x    04-14    143  |  106  |  11  ----------------------------<  105<H>  4.0   |  28  |  0.59    Ca    9.0      14 Apr 2019 06:41            CAPILLARY BLOOD GLUCOSE          RADIOLOGY & ADDITIONAL TESTS:

## 2019-04-15 NOTE — DISCHARGE NOTE PROVIDER - NSDCFUADDINST_GEN_ALL_CORE_FT
Continue toe-touch weight bearing on left lower extremity with rolling walker. Keep surgical inciisions/dressings clean and dry. Have dressings/sutures/staples removed and steri-strips applied post operative day #14 (4/25/19) if applicable. Follow up with Dr Angel 3-4 weeks post op Continue toe-touch weight bearing on left lower extremity with rolling walker. Keep surgical incisions/dressings clean and dry. Have dressings/sutures/staples removed and steri-strips applied post operative day #14 (4/25/19) if applicable. Follow up with Dr Angel 3-4 weeks post op

## 2019-04-15 NOTE — DISCHARGE NOTE PROVIDER - CARE PROVIDER_API CALL
Hardy Angel (MD)  Orthopaedic Surgery  [address]  [place] [address]  Phone: [phone]  Fax: [phone]  Follow Up Time:

## 2019-04-15 NOTE — PROGRESS NOTE ADULT - ATTENDING COMMENTS
No medical complications post-op to date and to proceed with physical therapy, as tolerated. Continues pulmonary toilet to lessen atelectasis, leg exercises as taught to lessen the risk of DVT and supervised pain medications for post-op pain control. Patient awaiting DC  to rehab

## 2019-04-15 NOTE — DISCHARGE NOTE PROVIDER - HOSPITAL COURSE
History of Present Illness:     68y Female community ambulator presents c/o L hip pain and inability to ambulate sp mechanical fall. Pt fell off bed onto floor 1day ago.  Denies HS/LOC. Denies numbness/tingling. Denies fever/chills. Denies pain/injury elsewhere.   Patient is s/p left hip replacement (12/4/18), L acetabulum ORIF 7/1/18, both of which were done by Dr Miller at [facility].    She has no other orthopedic complaints at this time             Review of Systems:    Other Review of Systems: All other review of systems negative, except as noted in HPI            Allergies and Intolerances:          Allergies:    	No Known Drug Allergies:     	No Known Drug Allergies:     	Nuts: Food, Nausea, Rash    	Nuts: Food, Unknown        Home Medications:     * Patient Currently Takes Medications as of 07-Dec-2018 08:58 documented in Structured Notes    · 	aspirin 325 mg oral delayed release tablet: 1 tab(s) orally 2 times a day for 6 weeks post op MDD:2    · 	oxyCODONE 5 mg oral tablet: 1 tab(s) orally every 4 hours, As needed, Moderate Pain (4 - 6) MDD:6    · 	traMADol 50 mg oral tablet: 1 tab(s) orally every 8 hours    · 	acetaminophen 325 mg oral tablet: 2 tab(s) orally every 6 hours, As needed, For Temp greater than 38 C (100.4 F), H/A, mild pain    · 	bisoprolol-hydrochlorothiazide 10 mg-6.25 mg oral tablet: 1 tab(s) orally once a day        .        Patient History:      Past Medical, Past Surgical, and Family History:    PAST MEDICAL HISTORY:    Fracture acetabulum-closed 06/30/2018        Hypertension         Mild intermittent asthma, unspecified whether complicated denies ED admission or ED admission- denies any use of brochodilators x 3 years        OA (osteoarthritis) of hip Left hip        Pneumonia 2015.         PAST SURGICAL HISTORY:    Fx wrist         S/P ORIF (open reduction internal fixation) fracture Left acetabulum 07/01/2108        Status post wrist surgery.         FAMILY HISTORY:    No pertinent family history in first degree relatives.         No Pertinent Family History in first degree relatives of: NA.        Hospital Course:    Patient admitted on 4/11/19 to [facility] for a Left Vanc B1 Ashly Prosthetic Fx and Scheduled for a ORIF on 4/11/19.  Patient cleared by medicine for scheduled procedure.  Patient with no complications throughout the course of her stay and her procedure.  PT recommended Rehabilitation and patient to be discharged to Rehab when bed becomes available. History of Present Illness:     68y Female community ambulator presents c/o L hip pain and inability to ambulate sp mechanical fall. Pt fell off bed onto floor 1day ago.  Denies HS/LOC. Denies numbness/tingling. Denies fever/chills. Denies pain/injury elsewhere.   Patient is s/p left hip replacement (12/4/18), L acetabulum ORIF 7/1/18, both of which were done by Dr Miller at [facility].    She has no other orthopedic complaints at this time        Patient History:      Past Medical, Past Surgical    PAST MEDICAL HISTORY:    Fracture acetabulum-closed 06/30/2018    hypertension     Mild intermittent asthma, unspecified whether complicated denies ED admission or ED admission- denies any use of brochodilators x 3 years    OA (osteoarthritis) of hip Left hip    Pneumonia 2015.         PAST SURGICAL HISTORY:    Fx wrist     S/P ORIF (open reduction internal fixation) fracture Left acetabulum 07/01/2108    Status post wrist surgery.         Hospital Course:    Patient admitted on 4/11/19 to [facility] for a Left Vanc B1 Ashly Prosthetic femur Fx and Scheduled for a ORIF on 4/11/19.  Patient cleared by medicine for scheduled procedure.  Patient with no complications throughout the course of her stay and her procedure.  Physical Therapist evaluated patient for toe-touch weight bearing on left lower extremity and recommended Rehabilitation and patient to be discharged to Rehab when bed becomes available. Patient should follow up with Dr Angel 3-4 weeks post op

## 2019-04-15 NOTE — DISCHARGE NOTE PROVIDER - NSDCCPCAREPLAN_GEN_ALL_CORE_FT
PRINCIPAL DISCHARGE DIAGNOSIS  Diagnosis: Other closed fracture of left femur, unspecified portion of femur, initial encounter  Assessment and Plan of Treatment:

## 2019-04-15 NOTE — PROGRESS NOTE ADULT - ASSESSMENT
69 yo woman with a hx of a THR in December presents s/p fall at home. Patient found to have a left periprosthetic fx. Had ORIF with plate and screws wtih 200 cc blood loss at OR. Hgn down from 13.7 to 10.8.  Need to follow H/H downtrend to see if she will need PRBC transfusion.

## 2019-04-15 NOTE — PROGRESS NOTE ADULT - SUBJECTIVE AND OBJECTIVE BOX
Patient seen and examined. Pain controlled. No acute events overnight    HEALTH ISSUES - PROBLEM Dx:  Other closed fracture of left femur, unspecified portion of femur, initial encounter: Other closed fracture of left femur, unspecified portion of femur, initial encounter  Anemia due to blood loss: Anemia due to blood loss  Pain: Pain  Hypertension: Hypertension  Femur fracture: Femur fracture        MEDICATIONS  (STANDING):  acetaminophen   Tablet .. 975 milliGRAM(s) Oral every 8 hours  bisoprolol   Tablet 10 milliGRAM(s) Oral daily  docusate sodium 100 milliGRAM(s) Oral three times a day  enoxaparin Injectable 40 milliGRAM(s) SubCutaneous every 24 hours  hydrochlorothiazide 12.5 milliGRAM(s) Oral daily  hydrochlorothiazide 12.5 milliGRAM(s) Oral daily  lactated ringers. 1000 milliLiter(s) IV Continuous <Continuous>  polyethylene glycol 3350 17 Gram(s) Oral daily  senna 2 Tablet(s) Oral at bedtime  sodium chloride 0.9%. 1000 milliLiter(s) IV Continuous <Continuous>    Allergies    No Known Drug Allergies  Nuts (Nausea; Rash)  Nuts (Unknown)    Intolerances                            8.4    9.39  )-----------( 302      ( 14 Apr 2019 10:23 )             26.9     14 Apr 2019 06:41    143    |  106    |  11     ----------------------------<  105    4.0     |  28     |  0.59     Ca    9.0        14 Apr 2019 06:41        Vital Signs Last 24 Hrs  T(C): 36.9 (04-15-19 @ 04:07), Max: 36.9 (04-15-19 @ 04:07)  T(F): 98.5 (04-15-19 @ 04:07), Max: 98.5 (04-15-19 @ 04:07)  HR: 68 (04-15-19 @ 04:07) (63 - 80)  BP: 128/66 (04-15-19 @ 04:07) (94/45 - 128/66)  BP(mean): --  RR: 16 (04-15-19 @ 04:07) (16 - 18)  SpO2: 97% (04-15-19 @ 04:07) (94% - 100%)    Physical Exam  Gen: NAD  LLE:  Dressing c/d/i  +ehl/fhl/ta/gs function  L2-S1 silt  Dp/pt pulse intact  No calf ttp  Compartments soft    A/P: 68y Female sp L periprosthetic femur ORIF  Pain control  DVT ppx  PT/TTWB LLE  FU labs  Ice/elevate  Medical management appreciated  Incentive spirometry  Dispo planning

## 2019-04-15 NOTE — DISCHARGE NOTE PROVIDER - NSDCACTIVITY_GEN_ALL_CORE
Do not make important decisions/Stairs allowed/Walking - Outdoors allowed/Do not drive or operate machinery/Walking - Indoors allowed/No heavy lifting/straining

## 2019-04-15 NOTE — DISCHARGE NOTE PROVIDER - CARE PROVIDERS DIRECT ADDRESSES
[email or web address]

## 2019-04-16 ENCOUNTER — TRANSCRIPTION ENCOUNTER (OUTPATIENT)
Age: 69
End: 2019-04-16

## 2019-04-16 VITALS
HEART RATE: 66 BPM | SYSTOLIC BLOOD PRESSURE: 113 MMHG | RESPIRATION RATE: 16 BRPM | TEMPERATURE: 98 F | OXYGEN SATURATION: 94 % | DIASTOLIC BLOOD PRESSURE: 53 MMHG

## 2019-04-16 PROCEDURE — 71045 X-RAY EXAM CHEST 1 VIEW: CPT

## 2019-04-16 PROCEDURE — C1889: CPT

## 2019-04-16 PROCEDURE — C1713: CPT

## 2019-04-16 PROCEDURE — 97530 THERAPEUTIC ACTIVITIES: CPT

## 2019-04-16 PROCEDURE — 80048 BASIC METABOLIC PNL TOTAL CA: CPT

## 2019-04-16 PROCEDURE — 86900 BLOOD TYPING SEROLOGIC ABO: CPT

## 2019-04-16 PROCEDURE — 86850 RBC ANTIBODY SCREEN: CPT

## 2019-04-16 PROCEDURE — 81003 URINALYSIS AUTO W/O SCOPE: CPT

## 2019-04-16 PROCEDURE — 93005 ELECTROCARDIOGRAM TRACING: CPT

## 2019-04-16 PROCEDURE — 85027 COMPLETE CBC AUTOMATED: CPT

## 2019-04-16 PROCEDURE — 76377 3D RENDER W/INTRP POSTPROCES: CPT

## 2019-04-16 PROCEDURE — 97110 THERAPEUTIC EXERCISES: CPT

## 2019-04-16 PROCEDURE — 76000 FLUOROSCOPY <1 HR PHYS/QHP: CPT

## 2019-04-16 PROCEDURE — 73700 CT LOWER EXTREMITY W/O DYE: CPT

## 2019-04-16 PROCEDURE — 97116 GAIT TRAINING THERAPY: CPT

## 2019-04-16 PROCEDURE — 86901 BLOOD TYPING SEROLOGIC RH(D): CPT

## 2019-04-16 PROCEDURE — C1769: CPT

## 2019-04-16 PROCEDURE — 99285 EMERGENCY DEPT VISIT HI MDM: CPT

## 2019-04-16 PROCEDURE — 97161 PT EVAL LOW COMPLEX 20 MIN: CPT

## 2019-04-16 PROCEDURE — 85610 PROTHROMBIN TIME: CPT

## 2019-04-16 PROCEDURE — 85730 THROMBOPLASTIN TIME PARTIAL: CPT

## 2019-04-16 RX ADMIN — Medication 975 MILLIGRAM(S): at 07:00

## 2019-04-16 RX ADMIN — Medication 975 MILLIGRAM(S): at 14:30

## 2019-04-16 RX ADMIN — ENOXAPARIN SODIUM 40 MILLIGRAM(S): 100 INJECTION SUBCUTANEOUS at 13:55

## 2019-04-16 RX ADMIN — Medication 975 MILLIGRAM(S): at 13:54

## 2019-04-16 RX ADMIN — Medication 975 MILLIGRAM(S): at 06:21

## 2019-04-16 RX ADMIN — Medication 12.5 MILLIGRAM(S): at 06:20

## 2019-04-16 RX ADMIN — BISOPROLOL FUMARATE 10 MILLIGRAM(S): 10 TABLET, FILM COATED ORAL at 06:20

## 2019-04-16 NOTE — PROGRESS NOTE ADULT - PROBLEM SELECTOR PLAN 3
Pain meds as needed  Follow for oversedation  GI regimen to prevent constipation

## 2019-04-16 NOTE — PROGRESS NOTE ADULT - PROBLEM SELECTOR PROBLEM 1
Femur fracture
Other closed fracture of left femur, unspecified portion of femur, initial encounter
Other closed fracture of left femur, unspecified portion of femur, initial encounter

## 2019-04-16 NOTE — PROGRESS NOTE ADULT - ASSESSMENT
S/P ORIF L periprosthetic fracture    Plan    OOB/PT/TTWB  D/C planning       May Rubalcava PA-C   Beeper    [id number]

## 2019-04-16 NOTE — PROGRESS NOTE ADULT - PROBLEM SELECTOR PLAN 1
No contraindication to scheduled procedure  DVT and GI prophylaxis  NPO for procedure
***See Above  Kwadwo FRAGA  Orthopedics  B: [id number]  S: [id number]
No contraindication to scheduled procedure  DVT and GI prophylaxis  Stable s/p ORIF left femur
Patient TTWB on LLE  DVT and GI prophylaxis  Stable s/p ORIF left femur
Patient TTWB on LLE  DVT and GI prophylaxis  Stable s/p ORIF left femur

## 2019-04-16 NOTE — PROGRESS NOTE ADULT - REASON FOR ADMISSION
L hip periprosthetic fracture

## 2019-04-16 NOTE — DISCHARGE NOTE NURSING/CASE MANAGEMENT/SOCIAL WORK - NSDCDPATPORTLINK_GEN_ALL_CORE
You can access the [facility] Patient Portal, offered by [facility], by registering with the following website: [email or web address]

## 2019-04-16 NOTE — PROGRESS NOTE ADULT - PROBLEM SELECTOR PLAN 2
continue current meds  follow lytes and renal function while on HCTZ  adjust meds as needed

## 2019-04-16 NOTE — PROGRESS NOTE ADULT - SUBJECTIVE AND OBJECTIVE BOX
67yo F hx htn, L hip replacement by Dr. Miller in December, 2018 presents  s/p mechanical fall from her bed falling on her L hip. Pt was sent to [facility] and found to have a periprosthetic fracture of L femur. Neurovascularly intact. Transferred here for Dr. Miller care. Patient had Open reduction and internal fixation of a left periprosthetic hip fx on 4/11/19.   Patient states she has been feeling well. Denies any chest pain or shortness of breath with exertion. Encouraged deep breathing exercises and incentive spirometry. Out of bed to chair awaiting physical therapy and weight bearing determination by orthopedics. There have been no interval changes over the past 24 hours and the patient remains hemodynamically stable.      MEDICATIONS  (STANDING):  acetaminophen   Tablet .. 975 milliGRAM(s) Oral every 8 hours  bisoprolol   Tablet 10 milliGRAM(s) Oral daily  docusate sodium 100 milliGRAM(s) Oral three times a day  enoxaparin Injectable 40 milliGRAM(s) SubCutaneous every 24 hours  hydrochlorothiazide 12.5 milliGRAM(s) Oral daily  hydrochlorothiazide 12.5 milliGRAM(s) Oral daily  lactated ringers. 1000 milliLiter(s) (75 mL/Hr) IV Continuous <Continuous>  polyethylene glycol 3350 17 Gram(s) Oral daily  senna 2 Tablet(s) Oral at bedtime  sodium chloride 0.9%. 1000 milliLiter(s) (75 mL/Hr) IV Continuous <Continuous>    MEDICATIONS  (PRN):  bisacodyl Suppository 10 milliGRAM(s) Rectal daily PRN If no bowel movement  magnesium hydroxide Suspension 30 milliLiter(s) Oral daily PRN Constipation  melatonin 3 milliGRAM(s) Oral at bedtime PRN Insomnia  morphine  - Injectable 2 milliGRAM(s) IV Push every 4 hours PRN Severe Pain (7 - 10)  morphine  - Injectable 2 milliGRAM(s) IV Push every 4 hours PRN Breakthrough pain  ondansetron Injectable 4 milliGRAM(s) IV Push every 6 hours PRN Nausea and/or Vomiting  oxyCODONE    IR 5 milliGRAM(s) Oral every 4 hours PRN Moderate Pain (4 - 6)  oxyCODONE    IR 10 milliGRAM(s) Oral every 4 hours PRN Severe Pain (7 - 10)  traMADol 25 milliGRAM(s) Oral every 8 hours PRN Mild Pain (1 - 3)          VITALS:   T(C): 36.5 (04-16-19 @ 00:02), Max: 36.9 (04-15-19 @ 04:07)  HR: 62 (04-16-19 @ 00:02) (58 - 68)  BP: 108/53 (04-16-19 @ 00:02) (108/53 - 128/66)  RR: 16 (04-16-19 @ 00:02) (16 - 18)  SpO2: 95% (04-16-19 @ 00:02) (94% - 97%)  Wt(kg): --      PHYSICAL EXAM:  GENERAL: NAD, well-groomed, well-developed  HEAD:  Atraumatic, Normocephalic  EYES: EOMI, PERRLA, conjunctiva and sclera clear  ENMT: No tonsillar erythema, exudates, or enlargement; Moist mucous membranes, Good dentition, No lesions  NECK: Supple, No JVD, Normal thyroid  NERVOUS SYSTEM:  Alert & Oriented X3, Good concentration; Motor Strength 5/5 B/L upper and lower extremities; DTRs 2+ intact and symmetric  CHEST/LUNG: Clear to percussion bilaterally; No rales, rhonchi, wheezing, or rubs  HEART: Regular rate and rhythm; No murmurs, rubs, or gallops  ABDOMEN: Soft, Nontender, Nondistended; Bowel sounds present  EXTREMITIES:  ORIF left periprosthetic fracture using plate and screws  LYMPH: No lymphadenopathy noted  SKIN: No rashes or lesions  LABS:        CBC Full  -  ( 15 Apr 2019 09:06 )  WBC Count : 9.61 K/uL  RBC Count : 2.86 M/uL  Hemoglobin : 8.4 g/dL  Hematocrit : 27.1 %  Platelet Count - Automated : 324 K/uL  Mean Cell Volume : 94.8 fl  Mean Cell Hemoglobin : 29.4 pg  Mean Cell Hemoglobin Concentration : 31.0 gm/dL  Auto Neutrophil # : x  Auto Lymphocyte # : x  Auto Monocyte # : x  Auto Eosinophil # : x  Auto Basophil # : x  Auto Neutrophil % : x  Auto Lymphocyte % : x  Auto Monocyte % : x  Auto Eosinophil % : x  Auto Basophil % : x    04-14    143  |  106  |  11  ----------------------------<  105<H>  4.0   |  28  |  0.59    Ca    9.0      14 Apr 2019 06:41            CAPILLARY BLOOD GLUCOSE          RADIOLOGY & ADDITIONAL TESTS: 69yo F hx htn, L hip replacement by Dr. Miller in December, 2018 presents  s/p mechanical fall from her bed falling on her L hip. Pt was sent to [facility] and found to have a periprosthetic fracture of L femur. Neurovascularly intact. Transferred here for Dr. Miller care. Patient had Open reduction and internal fixation of a left periprosthetic hip fx on 4/11/19.   Patient states she has been feeling well. Denies any chest pain or shortness of breath with exertion. Encouraged deep breathing exercises and incentive spirometry. Out of bed to chair awaiting physical therapy and weight bearing determination by orthopedics. There have been no interval changes over the past 24 hours and the patient remains hemodynamically stable.    MEDICATIONS  (STANDING):  acetaminophen   Tablet .. 975 milliGRAM(s) Oral every 8 hours  bisoprolol   Tablet 10 milliGRAM(s) Oral daily  docusate sodium 100 milliGRAM(s) Oral three times a day  enoxaparin Injectable 40 milliGRAM(s) SubCutaneous every 24 hours  hydrochlorothiazide 12.5 milliGRAM(s) Oral daily  hydrochlorothiazide 12.5 milliGRAM(s) Oral daily  lactated ringers. 1000 milliLiter(s) (75 mL/Hr) IV Continuous <Continuous>  polyethylene glycol 3350 17 Gram(s) Oral daily  senna 2 Tablet(s) Oral at bedtime  sodium chloride 0.9%. 1000 milliLiter(s) (75 mL/Hr) IV Continuous <Continuous>    MEDICATIONS  (PRN):  bisacodyl Suppository 10 milliGRAM(s) Rectal daily PRN If no bowel movement  magnesium hydroxide Suspension 30 milliLiter(s) Oral daily PRN Constipation  melatonin 3 milliGRAM(s) Oral at bedtime PRN Insomnia  morphine  - Injectable 2 milliGRAM(s) IV Push every 4 hours PRN Severe Pain (7 - 10)  morphine  - Injectable 2 milliGRAM(s) IV Push every 4 hours PRN Breakthrough pain  ondansetron Injectable 4 milliGRAM(s) IV Push every 6 hours PRN Nausea and/or Vomiting  oxyCODONE    IR 5 milliGRAM(s) Oral every 4 hours PRN Moderate Pain (4 - 6)  oxyCODONE    IR 10 milliGRAM(s) Oral every 4 hours PRN Severe Pain (7 - 10)  traMADol 25 milliGRAM(s) Oral every 8 hours PRN Mild Pain (1 - 3)    Vital Signs Last 24 Hrs  T(C): 36.4 (16 Apr 2019 14:07), Max: 36.8 (16 Apr 2019 08:08)  T(F): 97.6 (16 Apr 2019 14:07), Max: 98.2 (16 Apr 2019 08:08)  HR: 66 (16 Apr 2019 14:07) (56 - 67)  BP: 113/53 (16 Apr 2019 14:07) (103/52 - 131/65)  BP(mean): --  RR: 16 (16 Apr 2019 14:07) (16 - 18)  SpO2: 94% (16 Apr 2019 14:07) (93% - 100%)      PHYSICAL EXAM:  GENERAL: NAD, well-groomed, well-developed  HEAD:  Atraumatic, Normocephalic  EYES: EOMI, PERRLA, conjunctiva and sclera clear  ENMT: No tonsillar erythema, exudates, or enlargement; Moist mucous membranes, Good dentition, No lesions  NECK: Supple, No JVD, Normal thyroid  NERVOUS SYSTEM:  Alert & Oriented X3, Good concentration; Motor Strength 5/5 B/L upper and lower extremities; DTRs 2+ intact and symmetric  CHEST/LUNG: Clear to percussion bilaterally; No rales, rhonchi, wheezing, or rubs  HEART: Regular rate and rhythm; No murmurs, rubs, or gallops  ABDOMEN: Soft, Nontender, Nondistended; Bowel sounds present  EXTREMITIES:  ORIF left periprosthetic fracture using plate and screws  LYMPH: No lymphadenopathy noted  SKIN: No rashes or lesions    LABS:          CBC Full  -  ( 15 Apr 2019 09:06 )  WBC Count : 9.61 K/uL  RBC Count : 2.86 M/uL  Hemoglobin : 8.4 g/dL  Hematocrit : 27.1 %  Platelet Count - Automated : 324 K/uL  Mean Cell Volume : 94.8 fl  Mean Cell Hemoglobin : 29.4 pg  Mean Cell Hemoglobin Concentration : 31.0 gm/dL  Auto Neutrophil # : x  Auto Lymphocyte # : x  Auto Monocyte # : x  Auto Eosinophil # : x  Auto Basophil # : x  Auto Neutrophil % : x  Auto Lymphocyte % : x  Auto Monocyte % : x  Auto Eosinophil % : x  Auto Basophil % : x 67yo F hx htn, L hip replacement by Dr. Miller in December, 2018 presents  s/p mechanical fall from her bed falling on her L hip. Pt was sent to [facility] and found to have a periprosthetic fracture of L femur. Neurovascularly intact. Transferred here for Dr. Miller care. Patient had Open reduction and internal fixation of a left periprosthetic hip fx on 4/11/19.   Patient states she has been feeling well. Denies any chest pain or shortness of breath with exertion. Encouraged deep breathing exercises and incentive spirometry. Out of bed to chair awaiting physical therapy and weight bearing determination by orthopedics. There have been no interval changes over the past 24 hours and the patient remains hemodynamically stable. Awaiting rehab bed availability.    MEDICATIONS  (STANDING):  acetaminophen   Tablet .. 975 milliGRAM(s) Oral every 8 hours  bisoprolol   Tablet 10 milliGRAM(s) Oral daily  docusate sodium 100 milliGRAM(s) Oral three times a day  enoxaparin Injectable 40 milliGRAM(s) SubCutaneous every 24 hours  hydrochlorothiazide 12.5 milliGRAM(s) Oral daily  hydrochlorothiazide 12.5 milliGRAM(s) Oral daily  lactated ringers. 1000 milliLiter(s) (75 mL/Hr) IV Continuous <Continuous>  polyethylene glycol 3350 17 Gram(s) Oral daily  senna 2 Tablet(s) Oral at bedtime  sodium chloride 0.9%. 1000 milliLiter(s) (75 mL/Hr) IV Continuous <Continuous>    MEDICATIONS  (PRN):  bisacodyl Suppository 10 milliGRAM(s) Rectal daily PRN If no bowel movement  magnesium hydroxide Suspension 30 milliLiter(s) Oral daily PRN Constipation  melatonin 3 milliGRAM(s) Oral at bedtime PRN Insomnia  morphine  - Injectable 2 milliGRAM(s) IV Push every 4 hours PRN Severe Pain (7 - 10)  morphine  - Injectable 2 milliGRAM(s) IV Push every 4 hours PRN Breakthrough pain  ondansetron Injectable 4 milliGRAM(s) IV Push every 6 hours PRN Nausea and/or Vomiting  oxyCODONE    IR 5 milliGRAM(s) Oral every 4 hours PRN Moderate Pain (4 - 6)  oxyCODONE    IR 10 milliGRAM(s) Oral every 4 hours PRN Severe Pain (7 - 10)  traMADol 25 milliGRAM(s) Oral every 8 hours PRN Mild Pain (1 - 3)    Vital Signs Last 24 Hrs  T(C): 36.4 (16 Apr 2019 14:07), Max: 36.8 (16 Apr 2019 08:08)  T(F): 97.6 (16 Apr 2019 14:07), Max: 98.2 (16 Apr 2019 08:08)  HR: 66 (16 Apr 2019 14:07) (56 - 67)  BP: 113/53 (16 Apr 2019 14:07) (103/52 - 131/65)  BP(mean): --  RR: 16 (16 Apr 2019 14:07) (16 - 18)  SpO2: 94% (16 Apr 2019 14:07) (93% - 100%)      PHYSICAL EXAM:  GENERAL: NAD, well-groomed, well-developed  HEAD:  Atraumatic, Normocephalic  EYES: EOMI, PERRLA, conjunctiva and sclera clear  ENMT: No tonsillar erythema, exudates, or enlargement; Moist mucous membranes, Good dentition, No lesions  NECK: Supple, No JVD, Normal thyroid  NERVOUS SYSTEM:  Alert & Oriented X3, Good concentration; Motor Strength 5/5 B/L upper and lower extremities; DTRs 2+ intact and symmetric  CHEST/LUNG: Clear to percussion bilaterally; No rales, rhonchi, wheezing, or rubs  HEART: Regular rate and rhythm; No murmurs, rubs, or gallops  ABDOMEN: Soft, Nontender, Nondistended; Bowel sounds present  EXTREMITIES:  ORIF left periprosthetic fracture using plate and screws  LYMPH: No lymphadenopathy noted  SKIN: No rashes or lesions    LABS:          CBC Full  -  ( 15 Apr 2019 09:06 )  WBC Count : 9.61 K/uL  RBC Count : 2.86 M/uL  Hemoglobin : 8.4 g/dL  Hematocrit : 27.1 %  Platelet Count - Automated : 324 K/uL  Mean Cell Volume : 94.8 fl  Mean Cell Hemoglobin : 29.4 pg  Mean Cell Hemoglobin Concentration : 31.0 gm/dL  Auto Neutrophil # : x  Auto Lymphocyte # : x  Auto Monocyte # : x  Auto Eosinophil # : x  Auto Basophil # : x  Auto Neutrophil % : x  Auto Lymphocyte % : x  Auto Monocyte % : x  Auto Eosinophil % : x  Auto Basophil % : x

## 2019-04-16 NOTE — PROGRESS NOTE ADULT - ATTENDING COMMENTS
No medical complications post-op to date and to proceed with physical therapy, as tolerated. Continues pulmonary toilet to lessen atelectasis, leg exercises as taught to lessen the risk of DVT and supervised pain medications for post-op pain control. Patient awaiting DC  to rehab Cleared by orthopedics for discharge to rehabilitation.  Full instructions provided regarding diagnosis, treatment, discharge medications, diet and follow up care on transfer sheet. Medically stable and for discharge to rehabilitation today as planned.

## 2019-04-16 NOTE — PROGRESS NOTE ADULT - SUBJECTIVE AND OBJECTIVE BOX
Patient arrives to walk in clinic with complaint of vaginal discharge and odor . Onset yesterday.     Denies latex allergy.     Allergies reviewed.    PCP Yolanda Grace.     Medications verified and up to date.     Rx up to date.              Patient is a 68y old  Female who presents with a chief complaint of L hip periprosthetic fracture (15 Apr 2019 23:00)      POST OPERATIVE DAY #:   5  Patient comfortable  No complaints    VS:  T(C): 36.7 (04-16-19 @ 05:13), Max: 36.7 (04-15-19 @ 09:21)  T(F): 98 (04-16-19 @ 05:13), Max: 98.1 (04-15-19 @ 09:21)  HR: 67 (04-16-19 @ 05:13) (58 - 67)  BP: 131/65 (04-16-19 @ 05:13) (108/53 - 131/65)  RR: 16 (04-16-19 @ 05:13) (16 - 18)  SpO2: 100% (04-16-19 @ 05:13) (94% - 100%)  Wt(kg): --      PHYSICAL EXAM:  NAD, Alert  EXT:      L Hip Aquacels  Dressing C/D/I  No Calf Tenderness  (+) DF/PF; (+) Distal Pulses;  Sensation: No gross deficits noted  Pulses 2+   B/L, PAS     LABS:                        8.4<L>  9.61  )-----------( 324      ( 15 Apr 2019 09:06 )             27.1<L>

## 2019-04-19 ENCOUNTER — INBOUND DOCUMENT (OUTPATIENT)
Age: 69
End: 2019-04-19

## 2019-05-08 ENCOUNTER — APPOINTMENT (OUTPATIENT)
Dept: ORTHOPEDIC SURGERY | Facility: CLINIC | Age: 69
End: 2019-05-08
Payer: MEDICARE

## 2019-05-08 DIAGNOSIS — M97.02XD PERIPROSTHETIC FRACTURE AROUND INTERNAL PROSTHETIC LEFT HIP JOINT, SUBSEQUENT ENCOUNTER: ICD-10-CM

## 2019-05-08 PROCEDURE — 73552 X-RAY EXAM OF FEMUR 2/>: CPT | Mod: LT

## 2019-05-08 PROCEDURE — 99024 POSTOP FOLLOW-UP VISIT: CPT

## 2019-05-10 PROBLEM — M97.02XD PERIPROSTHETIC FRACTURE AROUND INTERNAL PROSTHETIC LEFT HIP JOINT, SUBSEQUENT ENCOUNTER: Status: ACTIVE | Noted: 2019-05-10

## 2019-05-10 NOTE — HISTORY OF PRESENT ILLNESS
[de-identified] : S/P ORIF LT [place] B1 periprosthetic femur fracture around total hip arthroplasty 4/11/19  [de-identified] : S/P ORIF LT [place] B1 periprosthetic femur fracture around total hip arthroplasty 4/11/19 She presents today for first post op followup she is currently in rehab doing well  [de-identified] : S/P ORIF LT [place] B1 periprosthetic femur fracture around total hip arthroplasty 4/11/19 \par - Continue WBAT and ROMAT \par - instructions for rehab given \par - F/U in 4 weeks with XR at that time  [de-identified] : Physical exam LT hip \par Incision CDI healed no erythema no drainage. \par minimal swelling and ecchymosis lateral thigh \par SILT L1-S1 TN SPN DPN SN \par Hip ROM 0-90 degree Flexion 0 degree ADD 30 degree ABD 20 degrees IR 20 degrees ER \par -4/5 strength quads/ abductors \par 4/5 TA PT GS EHL FHL peroneal \par 2+ distal pulses NVID distally  [de-identified] : 2 views left femur taken today and reviewed by me show well fixed left periprosthetic femur fracture around total hip arthroplasty. hardware in good position no signs of mechanical failure [de-identified] : S/P ORIF LT [place] B1 periprosthetic femur fracture around total hip arthroplasty 4/11/19

## 2019-05-22 NOTE — H&P PST ADULT - NSCAFFEAMTFREQ_GEN_ALL_CORE_SD
1-2 cups/cans per day Elliptical Excision Additional Text (Leave Blank If You Do Not Want): The margin was drawn around the clinically apparent lesion.  An elliptical shape was then drawn on the skin incorporating the lesion and margins.  Incisions were then made along these lines to the appropriate tissue plane and the lesion was extirpated.

## 2019-06-05 ENCOUNTER — APPOINTMENT (OUTPATIENT)
Dept: ORTHOPEDIC SURGERY | Facility: CLINIC | Age: 69
End: 2019-06-05

## 2020-09-08 NOTE — DISCHARGE NOTE NURSING/CASE MANAGEMENT/SOCIAL WORK - NSPROEXTENSIONSOFSELF_GEN_A_NUR
Detail Level: Generalized Skin normal color for race, warm, dry and intact. No evidence of rash. none Detail Level: Detailed

## 2021-06-10 NOTE — DISCHARGE NOTE PROVIDER - NSDCCPTREATMENT_GEN_ALL_CORE_FT
PRINCIPAL PROCEDURE  Procedure: Open reduction and internal fixation (ORIF) of fracture of left femur using plate and screws  Findings and Treatment: Yes...

## 2022-09-16 NOTE — H&P PST ADULT - RESPIRATORY
Intubation    Date/Time: 9/16/2022 10:07 AM  Performed by: Kirit Herrera CRNA  Authorized by: Loco Peralta MD     Intubation:     Induction:  Intravenous    Intubated:  Postinduction    Mask Ventilation:  N/a    Attempts:  1    Attempted By:  CRNA    Difficult Airway Encountered?: No      Complications:  None    Airway Device:  Supraglottic airway/LMA    Airway Device Size:  3.5    Style/Cuff Inflation:  Cuffed (inflated to minimal occlusive pressure)    Placement Verified By:  Capnometry    Complicating Factors:  None    Findings Post-Intubation:  BS equal bilateral and atraumatic/condition of teeth unchanged    
detailed exam

## 2022-11-15 NOTE — ED PROVIDER NOTE - PSH
98.1
Fx wrist    S/P ORIF (open reduction internal fixation) fracture  Left acetabulum 07/01/2108  Status post wrist surgery

## 2022-11-23 NOTE — PATIENT PROFILE ADULT - HOW PATIENT ADDRESSED, PROFILE
bilateral upper extremity ROM was WFL (within functional limits)/bilateral lower extremity ROM was WFL (within functional limits) travis

## 2023-05-17 NOTE — PATIENT PROFILE ADULT. - ANESTHESIA, PREVIOUS REACTION, PROFILE
Anesthesia Evaluation     Patient summary reviewed and Nursing notes reviewed   NPO Solid Status: > 8 hours  NPO Liquid Status: > 4 hours           Airway   Mallampati: II  TM distance: >3 FB  Neck ROM: full  no difficulty expected  Dental - normal exam     Pulmonary - normal exam   (-) decreased breath sounds, wheezes  Cardiovascular - normal exam    (+) hyperlipidemia,       Neuro/Psych  (+) headaches,    GI/Hepatic/Renal/Endo    (+)  GERD,      Musculoskeletal     Abdominal  - normal exam   Substance History      OB/GYN          Other        ROS/Med Hx Other: H/o vocal cord surgery. Speaks with a hoarse voice. No difficulty with previous ETT, speaking, or swallowing.                  Anesthesia Plan    ASA 2     general       Anesthetic plan, risks, benefits, and alternatives have been provided, discussed and informed consent has been obtained with: patient.    Plan discussed with CRNA.       none

## 2023-10-06 NOTE — ED ADULT NURSE NOTE - RESPIRATORY WDL
Continued Stay SW/CM Assessment/Plan of Care Note       Active Substitute Decision Maker (SDM)     MAYANALINI ALEXANDERCASSIDY Surrogate Primary Contact - Patient Caregiver, Legal Guardian   Primary Phone: [phone] (Mobile)                   Progress note:  SW is continuing to follow.  Writer reviewed medical record and received update from nursing during OFT's.  Aware that pt transferred to [facility] late yesterday but transferred back to [facility] early this morning s/p STAT team call after trach dislodged. Trach was successfully replaced and pt transferred to [facility] on vent support. Pt remains on the vent at this time. Aware that palliative care consult was placed today.  Prior to hospital admit pt was residing in the community with her guardian Cassidy who was her paid caregiver through [email or web address].  Pt was also active with [facility] (RN, VIOLETA and trach supplies.)  Pt is not stable for active discharge planning at this time given change in condition.  SW will follow.    See SW/CM flowsheets for other objective data.    Disposition Recommendations:  Preliminary discharge destination: Planned Discharge Destination: Home/apartment with Services/Support  SW/ recommendation for discharge: 24 Hour assist, Home care    Discharge Plan/Needs:     Continued Care and Services - Admitted Since 9/25/2023    Coordination has not been started for this encounter.     Continued Care and Services - Linked Episodes Includes continued care and service providers from the active episodes linked to this encounter, which are listed below    Care Transitions Episode start date: 10/4/2023   There are no active outsourced providers for this episode.             Prior To Hospitalization:    Living Situation: Guardian and residing at House    .  Support Systems: Guardian, Family members   Home Devices/Equipment: Trach or stoma supplies (T), Pulse Oximeter (T) ,   ,    ,      Mobility Assist Devices: Total lift   Type of Service Prior to Hospitalization: Case  manager, Social work ,   ,   ,   ,    ,       Patient/Family discharge goal (s):  24 Hour assist, Home Care     Resources provided:           Therapy Recommendations for Discharge:   PT:      Last Filed Values       Value Time User    PT Discharge Needs  does not require ongoing therapy 10/3/2023  1:31 PM Leesa Rodas, PT        OT:       Last Filed Values     None        SLP:    Last Filed Values     None          Mobility Equipment Recommended for Discharge: pt has tilt in space w/c, no other needs      Barriers to Discharge  Identified Barriers to Discharge/Transition Planning: Medical necessity for acute care                 Breathing spontaneous and unlabored. Breath sounds clear and equal bilaterally with regular rhythm.

## 2023-10-24 NOTE — PROGRESS NOTE ADULT - SUBJECTIVE AND OBJECTIVE BOX
Patient is a 67y old  Female who presents with a chief complaint of Left acetabulum fracture.  67y Female community ambulatory presents c/o L hip pain and inability to ambulate sp mechanical fall tripped over curb in parkinglot. Patient was seen at [facility] and transfered to [facility]. S/P ORIF of acetabular fracture on 07/01/18. Patient is more comfortable at this time. Denies HS/LOC. Denies numbness/tingling. Denies fever/chills. moderate pelvic pain post-op and beginning to ambulate without weight bearing.    MEDICATIONS  (STANDING):  cholecalciferol 1000 Unit(s) Oral daily  docusate sodium Liquid 100 milliGRAM(s) Oral three times a day  enoxaparin Injectable 40 milliGRAM(s) SubCutaneous daily  hydrochlorothiazide 12.5 milliGRAM(s) Oral daily    MEDICATIONS  (PRN):  acetaminophen   Tablet 650 milliGRAM(s) Oral every 6 hours PRN For Temp greater than 38 C (100.4 F)  acetaminophen  IVPB. 1000 milliGRAM(s) IV Intermittent once PRN Mild Pain (1 - 3)  ALBUTerol    90 MICROgram(s) HFA Inhaler 1 Puff(s) Inhalation every 4 hours PRN Shortness of Breath and/or Wheezing  diphenhydrAMINE   Injectable 12.5 milliGRAM(s) IV Push every 4 hours PRN Itching  HYDROmorphone  Injectable 1 milliGRAM(s) IV Push every 4 hours PRN Breakthrough Pain  ondansetron Injectable 4 milliGRAM(s) IV Push every 6 hours PRN Nausea  oxyCODONE    5 mG/acetaminophen 325 mG 1 Tablet(s) Oral every 4 hours PRN Mild to Moderate Pain  oxyCODONE    5 mG/acetaminophen 325 mG 2 Tablet(s) Oral every 4 hours PRN Severe Pain        Home Medications:  albuterol CFC free 90 mcg/inh inhalation aerosol: 1 puff(s) inhaled every 4 hours (01 Jul 2018 05:54)  bisoprolol-hydrochlorothiazide 10 mg-6.25 mg oral tablet: 1 tab(s) orally once a day (01 Jul 2018 05:54)  saccharomyces boulardii lyo 250 mg oral capsule: 2 cap(s) orally 2 times a day (01 Jul 2018 05:54)    Allergies    No Known Drug Allergies  Nuts (Nausea; Rash)    Vital Signs Last 24 Hrs  T(C): 36.8 (03 Jul 2018 20:34), Max: 37.3 (03 Jul 2018 04:33)  T(F): 98.3 (03 Jul 2018 20:34), Max: 99.1 (03 Jul 2018 04:33)  HR: 100 (03 Jul 2018 20:34) (84 - 107)  BP: 115/61 (03 Jul 2018 20:34) (108/65 - 121/59)  BP(mean): --  RR: 18 (03 Jul 2018 20:34) (16 - 18)  SpO2: 94% (03 Jul 2018 20:34) (92% - 96%)    PHYSICAL EXAM:  GENERAL: NAD, well nourished and conversant  HEAD:  Atraumatic  EYES: EOM, PERRLA, conjunctiva pink and sclera white  ENT: No tonsillar erythema, exudates, or enlargement, moist mucous membranes, good dentition, no lesions  NECK: Supple, No JVD, normal thyroid, carotids with normal upstrokes and no bruits  CHEST/LUNG: Clear to auscultation bilaterally, No rales, rhonchi, wheezing, or rubs  HEART: Regular rate and rhythm, No murmurs, rubs, or gallops  ABDOMEN: Soft, nondistended, no masses, guarding, tenderness or rebound, bowel sounds present  EXTREMITIES:  2+ Peripheral Pulses, No clubbing, cyanosis, or edema.   (+) Left Acetabular fracture  LYMPH: No lymphadenopathy noted  SKIN: No rashes or lesions  NERVOUS SYSTEM:  Alert & Oriented X3, normal cognitive function. Motor Strength 5/5 right upper and right lower.  5/5 left upper and left lower extremities, DTRs 2+ intact and symmetric    LABS:          CBC Full  -  ( 02 Jul 2018 08:05 )  WBC Count : 7.90 K/uL  Hemoglobin : 12.0 g/dL  Hematocrit : 37.6 %  Platelet Count - Automated : 225 K/uL  Mean Cell Volume : 91.9 fl  Mean Cell Hemoglobin : 29.3 pg  Mean Cell Hemoglobin Concentration : 31.9 gm/dL  Auto Neutrophil # : 5.24 K/uL  Auto Lymphocyte # : 1.84 K/uL  Auto Monocyte # : 0.73 K/uL  Auto Eosinophil # : 0.06 K/uL  Auto Basophil # : 0.02 K/uL  Auto Neutrophil % : 66.3 %  Auto Lymphocyte % : 23.3 %  Auto Monocyte % : 9.2 %  Auto Eosinophil % : 0.8 %  Auto Basophil % : 0.3 %    07-02    141  |  101  |  7   ----------------------------<  91  4.0   |  28  |  0.77    Ca    8.2<L>      02 Jul 2018 06:41    TPro  x   /  Alb  3.4  /  TBili  x   /  DBili  x   /  AST  x   /  ALT  x   /  AlkPhos  x   07-02    LIVER FUNCTIONS - ( 02 Jul 2018 06:41 )  Alb: 3.4 g/dL / Pro: x     / ALK PHOS: x     / ALT: x     / AST: x     / GGT: x Patient is a 67y old  Female who presents with a chief complaint of Left acetabulum fracture.  67y Female community ambulatory presents c/o L hip pain and inability to ambulate sp mechanical fall tripped over curb in parking lot. Patient was [facility] at [facility] [place] and transfered to [facility]. S/P ORIF of acetabular fracture on 07/01/18. Patient is more comfortable at this time. Denies HS/LOC. Denies numbness/tingling. Denies fever/chills.  Moderate  pelvic pain post-op and beginning to ambulate without weight bearing.  The patient continues to be out of bed to chair,  but has limited ability to transfer and ambulate because of the localized pelvic pain and the limitation of non weight bearing. In hospital physical therapy continues.    MEDICATIONS  (STANDING):  cholecalciferol 1000 Unit(s) Oral daily  docusate sodium Liquid 100 milliGRAM(s) Oral three times a day  enoxaparin Injectable 40 milliGRAM(s) SubCutaneous daily  hydrochlorothiazide 12.5 milliGRAM(s) Oral daily    MEDICATIONS  (PRN):  acetaminophen   Tablet 650 milliGRAM(s) Oral every 6 hours PRN For Temp greater than 38 C (100.4 F)  acetaminophen  IVPB. 1000 milliGRAM(s) IV Intermittent once PRN Mild Pain (1 - 3)  ALBUTerol    90 MICROgram(s) HFA Inhaler 1 Puff(s) Inhalation every 4 hours PRN Shortness of Breath and/or Wheezing  diphenhydrAMINE   Injectable 12.5 milliGRAM(s) IV Push every 4 hours PRN Itching  HYDROmorphone  Injectable 1 milliGRAM(s) IV Push every 4 hours PRN Breakthrough Pain  ondansetron Injectable 4 milliGRAM(s) IV Push every 6 hours PRN Nausea  oxyCODONE    5 mG/acetaminophen 325 mG 1 Tablet(s) Oral every 4 hours PRN Mild to Moderate Pain  oxyCODONE    5 mG/acetaminophen 325 mG 2 Tablet(s) Oral every 4 hours PRN Severe Pain        Home Medications:  albuterol CFC free 90 mcg/inh inhalation aerosol: 1 puff(s) inhaled every 4 hours (01 Jul 2018 05:54)  bisoprolol-hydrochlorothiazide 10 mg-6.25 mg oral tablet: 1 tab(s) orally once a day (01 Jul 2018 05:54)  saccharomyces boulardii lyo 250 mg oral capsule: 2 cap(s) orally 2 times a day (01 Jul 2018 05:54)    Allergies    No Known Drug Allergies  Nuts (Nausea; Rash)    Vital Signs Last 24 Hrs  T(C): 36.8 (03 Jul 2018 20:34), Max: 37.3 (03 Jul 2018 04:33)  T(F): 98.3 (03 Jul 2018 20:34), Max: 99.1 (03 Jul 2018 04:33)  HR: 100 (03 Jul 2018 20:34) (84 - 107)  BP: 115/61 (03 Jul 2018 20:34) (108/65 - 121/59)  BP(mean): --  RR: 18 (03 Jul 2018 20:34) (16 - 18)  SpO2: 94% (03 Jul 2018 20:34) (92% - 96%)    PHYSICAL EXAM:  GENERAL: NAD, well nourished and conversant  HEAD:  Atraumatic  EYES: EOM, PERRLA, conjunctiva pink and sclera white  ENT: No tonsillar erythema, exudates, or enlargement, moist mucous membranes, good dentition, no lesions  NECK: Supple, No JVD, normal thyroid, carotids with normal upstrokes and no bruits  CHEST/LUNG: Clear to auscultation bilaterally, No rales, rhonchi, wheezing, or rubs  HEART: Regular rate and rhythm, No murmurs, rubs, or gallops  ABDOMEN: Soft, nondistended, no masses, guarding, tenderness or rebound, bowel sounds present  EXTREMITIES:  2+ Peripheral Pulses, No clubbing, cyanosis, or edema.   (+) Left Acetabular fracture  LYMPH: No lymphadenopathy noted  SKIN: No rashes or lesions  NERVOUS SYSTEM:  Alert & Oriented X3, normal cognitive function. Motor Strength 5/5 right upper and right lower.  5/5 left upper and left lower extremities, DTRs 2+ intact and symmetric    LABS:          CBC Full  -  ( 02 Jul 2018 08:05 )  WBC Count : 7.90 K/uL  Hemoglobin : 12.0 g/dL  Hematocrit : 37.6 %  Platelet Count - Automated : 225 K/uL  Mean Cell Volume : 91.9 fl  Mean Cell Hemoglobin : 29.3 pg  Mean Cell Hemoglobin Concentration : 31.9 gm/dL  Auto Neutrophil # : 5.24 K/uL  Auto Lymphocyte # : 1.84 K/uL  Auto Monocyte # : 0.73 K/uL  Auto Eosinophil # : 0.06 K/uL  Auto Basophil # : 0.02 K/uL  Auto Neutrophil % : 66.3 %  Auto Lymphocyte % : 23.3 %  Auto Monocyte % : 9.2 %  Auto Eosinophil % : 0.8 %  Auto Basophil % : 0.3 %    07-02    141  |  101  |  7   ----------------------------<  91  4.0   |  28  |  0.77    Ca    8.2<L>      02 Jul 2018 06:41    TPro  x   /  Alb  3.4  /  TBili  x   /  DBili  x   /  AST  x   /  ALT  x   /  AlkPhos  x   07-02    LIVER FUNCTIONS - ( 02 Jul 2018 06:41 )  Alb: 3.4 g/dL / Pro: x     / ALK PHOS: x     / ALT: x     / AST: x     / GGT: x Carac Counseling:  I discussed with the patient the risks of Carac including but not limited to erythema, scaling, itching, weeping, crusting, and pain.

## 2024-03-08 NOTE — OCCUPATIONAL THERAPY INITIAL EVALUATION ADULT - NS ASR OT EQUIP NEEDS DISCH
Pt presents with c/o eye problem, ear pain    Mom thinks her ears are hurting her and has had red eyes with out discharge for a week and now is getting discharge    No otc meds         
dressing

## 2024-05-20 NOTE — H&P ADULT - CLICK TO LAUNCH ORM
. negative cranial nerves II-XII intact/sensation intact/responds to verbal commands/no spontaneous movement

## 2024-08-16 NOTE — DISCHARGE NOTE ADULT - THE PATIENT HAS
no difficulties Photo Preface (Leave Blank If You Do Not Want): Photographs were obtained today Detail Level: Zone Details (Free Text): R leg

## 2025-02-11 NOTE — DISCHARGE NOTE ADULT - PATIENT PORTAL LINK FT
You can access the [facility] Patient Portal, offered by [facility], by registering with the following website: [email or web address]
No